# Patient Record
Sex: MALE | Race: WHITE | NOT HISPANIC OR LATINO | Employment: FULL TIME | URBAN - METROPOLITAN AREA
[De-identification: names, ages, dates, MRNs, and addresses within clinical notes are randomized per-mention and may not be internally consistent; named-entity substitution may affect disease eponyms.]

---

## 2017-10-07 ENCOUNTER — HOSPITAL ENCOUNTER (OUTPATIENT)
Dept: LAB | Facility: HOSPITAL | Age: 32
Discharge: HOME | End: 2017-10-07
Attending: FAMILY MEDICINE | Admitting: FAMILY MEDICINE

## 2017-10-07 LAB
ABS VARIANT LYMPHS: 0.64 10*3/UL
ALBUMIN SERPL-MCNC: 4.4 G/DL (ref 3.5–4.8)
ALBUMIN/GLOB SERPL: 1.5 {RATIO} (ref 1–1.7)
ALP SERPL-CCNC: 42 IU/L (ref 32–91)
ALT SERPL-CCNC: 121 IU/L (ref 17–63)
ANION GAP SERPL CALC-SCNC: 7.4 MMOL/L (ref 10–20)
AST SERPL-CCNC: 48 IU/L (ref 15–41)
BILIRUB SERPL-MCNC: 0.6 MG/DL (ref 0.3–1.2)
BUN SERPL-MCNC: 8 MG/DL (ref 8–20)
BUN/CREAT SERPL: 8 (ref 6.2–20.3)
CALCIUM SERPL-MCNC: 9.1 MG/DL (ref 8.9–10.3)
CHLORIDE SERPL-SCNC: 104 MMOL/L (ref 101–111)
CONV ABS BANDS: 0.1 10*3/UL
CONV CO2: 28 MMOL/L (ref 22–32)
CONV SMEAR REVIEW: (no result)
CONV TOTAL PROTEIN: 7.4 G/DL (ref 6.1–7.9)
CONV VARIANT LYMPHOCYTES RELATIVE PERCENT BY MANUAL COUNT: 9 % (ref 0–1)
CREAT UR-MCNC: 1 MG/DL (ref 0.7–1.2)
DIFFERENTIAL METHOD BLD: (no result)
EOSINOPHIL # BLD AUTO: 0.4 10*3/UL (ref 0–0.3)
EOSINOPHIL # BLD AUTO: 6 % (ref 0–3)
ERYTHROCYTE [DISTWIDTH] IN BLOOD BY AUTOMATED COUNT: 13.1 % (ref 11.5–14.5)
GLOBULIN UR ELPH-MCNC: 3 G/DL (ref 2.5–3.8)
GLUCOSE SERPL-MCNC: 98 MG/DL (ref 65–99)
HCT VFR BLD AUTO: 50.5 % (ref 40–54)
HGB BLD-MCNC: 17.7 G/DL (ref 14–18)
LYMPHOCYTES # BLD AUTO: 1.6 10*3/UL (ref 0.8–4.8)
LYMPHOCYTES NFR BLD AUTO: 23 % (ref 18–42)
MCH RBC QN AUTO: 30.8 PG (ref 26–32)
MCHC RBC AUTO-ENTMCNC: 35.2 G/DL (ref 32–36)
MCV RBC AUTO: 87.5 FL (ref 80–94)
MONOCYTES # BLD AUTO: 0.6 10*3/UL (ref 0.1–1.3)
MONOCYTES NFR BLD AUTO: 9 % (ref 2–11)
NEUTROPHILS # BLD AUTO: 3.8 10*3/UL (ref 2.3–8.6)
NEUTROPHILS NFR BLD AUTO: 52 % (ref 50–75)
NEUTS BAND NFR BLD MANUAL: 1 % (ref 0–5)
PLATELET # BLD AUTO: 375 10*3/UL (ref 150–450)
PMV BLD AUTO: 7.6 FL (ref 7.4–10.4)
POTASSIUM SERPL-SCNC: 4.4 MMOL/L (ref 3.6–5.1)
RBC # BLD AUTO: 5.77 10*6/UL (ref 4.6–6)
SODIUM SERPL-SCNC: 135 MMOL/L (ref 136–144)
WBC # BLD AUTO: 7.1 10*3/UL (ref 4.5–11.5)

## 2017-11-30 ENCOUNTER — HOSPITAL ENCOUNTER (OUTPATIENT)
Dept: OTHER | Facility: HOSPITAL | Age: 32
Setting detail: SPECIMEN
Discharge: HOME | End: 2017-11-30
Attending: NURSE PRACTITIONER | Admitting: NURSE PRACTITIONER

## 2017-11-30 LAB
ALBUMIN SERPL-MCNC: 4.3 G/DL (ref 3.5–4.8)
ALBUMIN/GLOB SERPL: 1.5 {RATIO} (ref 1–1.7)
ALP SERPL-CCNC: 39 IU/L (ref 32–91)
ALT SERPL-CCNC: 165 IU/L (ref 17–63)
ANION GAP SERPL CALC-SCNC: 11.5 MMOL/L (ref 10–20)
AST SERPL-CCNC: 85 IU/L (ref 15–41)
BILIRUB SERPL-MCNC: 0.7 MG/DL (ref 0.3–1.2)
BUN SERPL-MCNC: 19 MG/DL (ref 8–20)
BUN/CREAT SERPL: 19 (ref 6.2–20.3)
CALCIUM SERPL-MCNC: 9.3 MG/DL (ref 8.9–10.3)
CHLORIDE SERPL-SCNC: 102 MMOL/L (ref 101–111)
CHOLEST SERPL-MCNC: 239 MG/DL
CHOLEST/HDLC SERPL: 6.3 {RATIO}
CONV CO2: 27 MMOL/L (ref 22–32)
CONV LDL CHOLESTEROL DIRECT: 166 MG/DL (ref 0–100)
CONV TOTAL PROTEIN: 7.2 G/DL (ref 6.1–7.9)
CREAT UR-MCNC: 1 MG/DL (ref 0.7–1.2)
GLOBULIN UR ELPH-MCNC: 2.9 G/DL (ref 2.5–3.8)
GLUCOSE SERPL-MCNC: 73 MG/DL (ref 65–99)
HDLC SERPL-MCNC: 38 MG/DL
LDLC/HDLC SERPL: 4.4 {RATIO}
LIPID INTERPRETATION: ABNORMAL
POTASSIUM SERPL-SCNC: 4.5 MMOL/L (ref 3.6–5.1)
SODIUM SERPL-SCNC: 136 MMOL/L (ref 136–144)
TRIGL SERPL-MCNC: 209 MG/DL
VLDLC SERPL CALC-MCNC: 35 MG/DL

## 2017-12-28 ENCOUNTER — HOSPITAL ENCOUNTER (OUTPATIENT)
Dept: OTHER | Facility: HOSPITAL | Age: 32
Setting detail: SPECIMEN
Discharge: HOME | End: 2017-12-28
Attending: NURSE PRACTITIONER | Admitting: NURSE PRACTITIONER

## 2017-12-28 LAB
ALBUMIN SERPL-MCNC: 4.4 G/DL (ref 3.5–4.8)
ALBUMIN/GLOB SERPL: 1.8 {RATIO} (ref 1–1.7)
ALP SERPL-CCNC: 42 IU/L (ref 32–91)
ALT SERPL-CCNC: 66 IU/L (ref 17–63)
ANION GAP SERPL CALC-SCNC: 14.3 MMOL/L (ref 10–20)
AST SERPL-CCNC: 30 IU/L (ref 15–41)
BACTERIA SPEC AEROBE CULT: NORMAL
BASOPHILS # BLD AUTO: 0.1 10*3/UL (ref 0–0.2)
BASOPHILS NFR BLD AUTO: 1 % (ref 0–2)
BILIRUB SERPL-MCNC: 0.6 MG/DL (ref 0.3–1.2)
BUN SERPL-MCNC: 12 MG/DL (ref 8–20)
BUN/CREAT SERPL: 10 (ref 6.2–20.3)
CALCIUM SERPL-MCNC: 9.3 MG/DL (ref 8.9–10.3)
CHLORIDE SERPL-SCNC: 102 MMOL/L (ref 101–111)
CONV CO2: 24 MMOL/L (ref 22–32)
CONV TOTAL PROTEIN: 6.9 G/DL (ref 6.1–7.9)
CREAT UR-MCNC: 1.2 MG/DL (ref 0.7–1.2)
DIFFERENTIAL METHOD BLD: (no result)
EOSINOPHIL # BLD AUTO: 0 % (ref 0–3)
EOSINOPHIL # BLD AUTO: 0 10*3/UL (ref 0–0.3)
ERYTHROCYTE [DISTWIDTH] IN BLOOD BY AUTOMATED COUNT: 13.1 % (ref 11.5–14.5)
GLOBULIN UR ELPH-MCNC: 2.5 G/DL (ref 2.5–3.8)
GLUCOSE SERPL-MCNC: 86 MG/DL (ref 65–99)
HCT VFR BLD AUTO: 44.8 % (ref 40–54)
HGB BLD-MCNC: 15.6 G/DL (ref 14–18)
LYMPHOCYTES # BLD AUTO: 1 10*3/UL (ref 0.8–4.8)
LYMPHOCYTES NFR BLD AUTO: 15 % (ref 18–42)
Lab: NORMAL
MCH RBC QN AUTO: 30.5 PG (ref 26–32)
MCHC RBC AUTO-ENTMCNC: 34.8 G/DL (ref 32–36)
MCV RBC AUTO: 87.8 FL (ref 80–94)
MICRO REPORT STATUS: NORMAL
MONOCYTES # BLD AUTO: 0.9 10*3/UL (ref 0.1–1.3)
MONOCYTES NFR BLD AUTO: 12 % (ref 2–11)
NEUTROPHILS # BLD AUTO: 5.2 10*3/UL (ref 2.3–8.6)
NEUTROPHILS NFR BLD AUTO: 72 % (ref 50–75)
NRBC BLD AUTO-RTO: 0 /100{WBCS}
NRBC/RBC NFR BLD MANUAL: 0 10*3/UL
PLATELET # BLD AUTO: 317 10*3/UL (ref 150–450)
PMV BLD AUTO: 7.5 FL (ref 7.4–10.4)
POTASSIUM SERPL-SCNC: 4.3 MMOL/L (ref 3.6–5.1)
RBC # BLD AUTO: 5.1 10*6/UL (ref 4.6–6)
SODIUM SERPL-SCNC: 136 MMOL/L (ref 136–144)
SPECIMEN SOURCE: NORMAL
WBC # BLD AUTO: 7.1 10*3/UL (ref 4.5–11.5)

## 2018-08-30 ENCOUNTER — HOSPITAL ENCOUNTER (OUTPATIENT)
Dept: LAB | Facility: HOSPITAL | Age: 33
Discharge: HOME | End: 2018-08-30
Attending: PSYCHIATRY & NEUROLOGY | Admitting: PSYCHIATRY & NEUROLOGY

## 2018-08-30 LAB
ALBUMIN SERPL-MCNC: 4.6 G/DL (ref 3.5–4.8)
ALBUMIN/GLOB SERPL: 1.6 {RATIO} (ref 1–1.7)
ALP SERPL-CCNC: 47 IU/L (ref 32–91)
ALT SERPL-CCNC: 217 IU/L (ref 17–63)
ANION GAP SERPL CALC-SCNC: 13.5 MMOL/L (ref 10–20)
AST SERPL-CCNC: 94 IU/L (ref 15–41)
BASOPHILS # BLD AUTO: 0.1 10*3/UL (ref 0–0.2)
BASOPHILS NFR BLD AUTO: 1 % (ref 0–2)
BILIRUB SERPL-MCNC: 1 MG/DL (ref 0.3–1.2)
BUN SERPL-MCNC: 9 MG/DL (ref 8–20)
BUN/CREAT SERPL: 9 (ref 6.2–20.3)
CALCIUM SERPL-MCNC: 9.6 MG/DL (ref 8.9–10.3)
CHLORIDE SERPL-SCNC: 104 MMOL/L (ref 101–111)
CONV CO2: 25 MMOL/L (ref 22–32)
CONV TOTAL PROTEIN: 7.5 G/DL (ref 6.1–7.9)
CREAT UR-MCNC: 1 MG/DL (ref 0.7–1.2)
DIFFERENTIAL METHOD BLD: (no result)
EOSINOPHIL # BLD AUTO: 0.2 10*3/UL (ref 0–0.3)
EOSINOPHIL # BLD AUTO: 2 % (ref 0–3)
ERYTHROCYTE [DISTWIDTH] IN BLOOD BY AUTOMATED COUNT: 13.3 % (ref 11.5–14.5)
ERYTHROCYTE [SEDIMENTATION RATE] IN BLOOD BY WESTERGREN METHOD: 7 MM/HR (ref 0–15)
GLOBULIN UR ELPH-MCNC: 2.9 G/DL (ref 2.5–3.8)
GLUCOSE SERPL-MCNC: 94 MG/DL (ref 65–99)
HCT VFR BLD AUTO: 45.4 % (ref 40–54)
HGB BLD-MCNC: 16.1 G/DL (ref 14–18)
LYMPHOCYTES # BLD AUTO: 2.3 10*3/UL (ref 0.8–4.8)
LYMPHOCYTES NFR BLD AUTO: 31 % (ref 18–42)
MCH RBC QN AUTO: 31.2 PG (ref 26–32)
MCHC RBC AUTO-ENTMCNC: 35.6 G/DL (ref 32–36)
MCV RBC AUTO: 87.6 FL (ref 80–94)
MONOCYTES # BLD AUTO: 0.6 10*3/UL (ref 0.1–1.3)
MONOCYTES NFR BLD AUTO: 8 % (ref 2–11)
NEUTROPHILS # BLD AUTO: 4.5 10*3/UL (ref 2.3–8.6)
NEUTROPHILS NFR BLD AUTO: 58 % (ref 50–75)
NRBC BLD AUTO-RTO: 0 /100{WBCS}
NRBC/RBC NFR BLD MANUAL: 0 10*3/UL
PLATELET # BLD AUTO: 398 10*3/UL (ref 150–450)
PMV BLD AUTO: 7.9 FL (ref 7.4–10.4)
POTASSIUM SERPL-SCNC: 4.5 MMOL/L (ref 3.6–5.1)
RBC # BLD AUTO: 5.18 10*6/UL (ref 4.6–6)
SODIUM SERPL-SCNC: 138 MMOL/L (ref 136–144)
WBC # BLD AUTO: 7.7 10*3/UL (ref 4.5–11.5)

## 2018-08-31 LAB
ANA SER QL IA: ABNORMAL
CHROMATIN AB SERPL-ACNC: <20 [IU]/ML (ref 0–20)
HAV IGM SERPL QL IA: NONREACTIVE
HBV CORE IGM SERPL QL IA: NONREACTIVE
HBV SURFACE AG SERPL QL IA: NONREACTIVE
HCV AB SER DONR QL: NORMAL
HCV AB SER DONR QL: NORMAL

## 2019-05-24 ENCOUNTER — CONVERSION ENCOUNTER (OUTPATIENT)
Dept: OTHER | Facility: HOSPITAL | Age: 34
End: 2019-05-24

## 2019-05-30 ENCOUNTER — HOSPITAL ENCOUNTER (OUTPATIENT)
Dept: OTHER | Facility: HOSPITAL | Age: 34
Setting detail: SPECIMEN
Discharge: HOME | End: 2019-05-30
Attending: NURSE PRACTITIONER | Admitting: NURSE PRACTITIONER

## 2019-05-30 LAB
ALBUMIN SERPL-MCNC: 4.4 G/DL (ref 3.5–4.8)
ALBUMIN/GLOB SERPL: 1.5 {RATIO} (ref 1–1.7)
ALP SERPL-CCNC: 53 IU/L (ref 32–91)
ALT SERPL-CCNC: 76 IU/L (ref 17–63)
ANION GAP SERPL CALC-SCNC: 18.2 MMOL/L (ref 10–20)
AST SERPL-CCNC: 36 IU/L (ref 15–41)
BILIRUB SERPL-MCNC: 0.7 MG/DL (ref 0.3–1.2)
BUN SERPL-MCNC: 12 MG/DL (ref 8–20)
BUN/CREAT SERPL: 10.9 (ref 6.2–20.3)
CALCIUM SERPL-MCNC: 9.3 MG/DL (ref 8.9–10.3)
CHLORIDE SERPL-SCNC: 103 MMOL/L (ref 101–111)
CONV CO2: 20 MMOL/L (ref 22–32)
CONV TOTAL PROTEIN: 7.3 G/DL (ref 6.1–7.9)
CREAT UR-MCNC: 1.1 MG/DL (ref 0.7–1.2)
GLOBULIN UR ELPH-MCNC: 2.9 G/DL (ref 2.5–3.8)
GLUCOSE SERPL-MCNC: 116 MG/DL (ref 65–99)
POTASSIUM SERPL-SCNC: 4.2 MMOL/L (ref 3.6–5.1)
SODIUM SERPL-SCNC: 137 MMOL/L (ref 136–144)

## 2019-06-04 VITALS
DIASTOLIC BLOOD PRESSURE: 86 MMHG | SYSTOLIC BLOOD PRESSURE: 141 MMHG | OXYGEN SATURATION: 97 % | WEIGHT: 237 LBS | HEIGHT: 72 IN | HEART RATE: 90 BPM | BODY MASS INDEX: 32.1 KG/M2

## 2019-06-06 ENCOUNTER — HOSPITAL ENCOUNTER (OUTPATIENT)
Dept: ULTRASOUND IMAGING | Facility: HOSPITAL | Age: 34
Discharge: HOME | End: 2019-06-06
Attending: NURSE PRACTITIONER | Admitting: NURSE PRACTITIONER

## 2019-06-06 NOTE — PROGRESS NOTES
Visit Type:  Annual Physical  Referring Provider:  Camille Odonnell NP  Primary Provider:  Blas SETHI NP      History of Present Illness:  This is a 33 year old male who presents for an annual exam. Patient had recently had a back injury and has been doing well since. He had labs completed in the fall.           Risk Factors:     Smoked Tobacco Use:  Never smoker  Smokeless Tobacco Use:  Never  Passive smoke exposure:  no  Drug use:  yes     Substance:  marijuana  HIV high-risk behavior:  no  Caffeine use:  0 drinks per day  Alcohol use:  yes     Type:  occasional     Drinks per day:  social     Has patient --        Felt need to cut down:  no        Been annoyed by complaints:  no        Felt guilty about drinking:  no        Needed eye opener in the morning:  no     Counseled to quit/cut down alcohol use:  no  Exercise:  yes  Seatbelt use:  100 %  Sun Exposure:  occasionally    Family History Risk Factors:     Family History of MI in females < 65 years old:  no     Family History of MI in males < 55 years old:  no    Previous Tobacco Use: Signed On - 02/12/2019  Smoked Tobacco Use:  Never smoker  Smokeless Tobacco Use:  Never  Passive smoke exposure:  no  Drug use:  yes     Substance:  marijuana  HIV high-risk behavior:  no  Caffeine use:  0 drinks per day    Previous Alcohol Use: Signed On - 02/12/2019  Alcohol use:  yes     Type:  occasional     Drinks per day:  social     Has patient --        Felt need to cut down:  no        Been annoyed by complaints:  no        Felt guilty about drinking:  no        Needed eye opener in the morning:  no     Counseled to quit/cut down alcohol use:  no  Exercise:  yes  Seatbelt use:  100 %  Sun Exposure:  occasionally    Family History Risk Factors:     Family History of MI in females < 65 years old:  no     Family History of MI in males < 55 years old:  no      Review of Systems     General       Complains of weight gain.       Denies fever, chills, sweats, anorexia,  fatigue, weakness, malaise, weight loss and sleep disorder.    Eyes       Complains of eye irritation.       Denies vision loss - both eyes, blurring, eye pain, halos, discharge, light sensitivity, Color Blindness, Decreased night vision, excessive tearing, eye redness and periorbital puffiness.    ENT       Denies ringing in the ears, ear discharge, earache, decreased hearing, nasal congestion, nosebleeds, difficulty swallowing, hoarseness, sore throat, Post-nasal drainage, sneezing, bleeding gums, oral ulcers, decreased sense of smell, dental issues,   runny nose, sinus pain and decreased sense of taste.    CV       Denies difficulty breathing at night, near fainting, chest pain or discomfort, racing/skipping heart beats, fatigue, lightheadedness, shortness of breath with exertion, palpitations, swelling of hands or feet, difficulty breathing while lying down,   fainting, leg cramps with exertion, bluish discoloration of lips or nails, weight gain, leg cramps, leg pain, varicose veins, paroxysmal nocturnal dyspnea and bluish or purplish discoloration of hands/feet.    Resp       Denies sleep disturbances due to breathing, cough, shortness of breath, coughing up blood, chest discomfort, wheezing, excessive sputum, excessive snoring and TB exposure risk.    GI       Complains of excessive appetite.       Denies loss of appetite, indigestion, vomiting blood, nausea, vomiting, yellowish skin color, gas, abdominal pain, abdominal bloating, hemorrhoids, diarrhea, change in bowel habits, constipation, dark tarry stools, bloody stools, abdominal mass,   abdominal swelling, food intolerance, early satiety, stool incontinence, laxative use, odynophagia, painful defecation, need for antacids and blood after wiping.           Denies dysuria, hematuria, discharge, urinary frequency, urinary hesitancy, nocturia, incontinence, genital sores, decreased libido, erectile dysfunction, Change in bladder habits, Change in urinary  stream, Erection at night, Flank Pain, Testicular   Mass, Testicular Pain, Urgency, Retention, Kidney Stones, Difficulty Stopping Stream, Hx of STD and Recurrent UTI's.    MS       Complains of back pain and stiffness.       Denies muscle cramps, joint pain, joint swelling, presence of joint fluid, muscle weakness, arthritis, gout, loss of strength, muscle aches, neck pain, decreased ROM and joint redness.    Derm       Denies excessive perspiration, night sweats, suspicious lesions, changes in nail beds, dryness, poor wound healing, unusual hair distribution, skin cancer, itching, changes in color of skin, flushing, rash, change in lesion, hair loss, change in hair   texture, lesion with inflammation, lesion with increase in size and lesion with change in color.        eczematous rash on bilateral posterior upper arms    Neuro       Denies difficulty with concentration, poor balance, headaches, disturbances in coordination, numbness, inability to speak, falling down, tingling, brief paralysis, visual disturbances, seizures, weakness, sensation of room spinning, tremors, fainting,   excessive daytime sleeping, memory loss, dizziness, attention deficit, hyperactivity, unusual sensation and restless legs.    Psych       Complains of anxiety and depression.       Denies sense of great danger, thoughts of suicide, mental problems, thoughts of violence, frightening visions or sounds, change in sleep pattern, delusions, disorientation, easily irritated, feels safe at home, frequent crying, hallucinations,   hypersomnia, impaired cognitive function, inability to concentrate, mood changes, nervousness and personality changes.    Endo       Complains of excessive hunger and weight change.       Denies cold intolerance, heat intolerance, excessive urination, excessive  thirst, appetite changes, excessive sweating, hair changes, hot flashes, libido change, change in body hair and Hypoglycemic episodes.    Heme       Denies  enlarged lymph nodes, bleeding, skin discoloration, abnormal bruising, fevers and nose bleeds.    Allergy       Complains of seasonal allergies.       Denies persistent infections, hives or rash and HIV exposure.    Breast       Denies Mass, Pain, Swelling, Nipple discharge, Nipple pain, Skin changes and Change in size.      Vital Signs:    Patient Profile:    33 Years Old Male  Height:     72 inches  Weight:     237 pounds  BMI:        32.14     O2 Sat:     97 %  Pulse rate: 90 / minute  BP Sittin / 86  (left arm)    Cuff size:  large    Allergies: Allergies were reviewed with the patient during this visit.  No Known Allergy.        Vitals Entered By: [employer] (May 24, 2019 1:09 PM)      Vital Signs:    Patient Profile:    33 Years Old Male  Height:     72 inches  Weight:     237 pounds  (Measured Weight:   lbs.  oz.)  BMI:        32.14  Pulse rate: 90 / minute  O2 Sat:     97 %    BP sittin / 86  (left arm)  Cuff size:  large   Vitals Entered By: [facility] (May 24, 2019 1:09 PM)    Allergies:   No Known Allergies  Allergies were reviewed with the patient during this visit.      Physical Exam    General:      well developed, well nourished, in no acute distress.    Head:      normocephalic and atraumatic.    Eyes:      PERRL/EOM intact, conjunctiva and sclera clear with out nystagmus.    Ears:      TM's intact and clear with normal canals with grossly normal hearing.    Nose:      no deformity, discharge, inflammation, or lesions.    Mouth:      no deformity or lesions with good dentition.    Lungs:      clear bilaterally to auscultation.    Heart:      non-displaced PMI, chest non-tender; regular rate and rhythm, S1, S2 without murmurs, rubs, or gallops  Abdomen:       normal bowel sounds; no hepatosplenomegaly no ventral,umbilical hernias or masses noted.    Msk:      no deformity or scoliosis noted of thoracic or lumbar spine.    Pulses:      pulses normal in all 4 extremities.     Extremities:      no clubbing, cyanosis, edema, or deformity noted with normal full range of motion of joints of all four extremities   Neurologic:      no focal deficits, cranial nerves II-XII grossly intact with normal sensation, reflexes  Skin:      intact without lesions or rashes.  eczematous rash on bilateral posterior upper arms.    Cervical Nodes:      no significant adenopathy.    Psych:      alert and cooperative; normal mood and affect; normal attention span and concentration.      Diabetes Management Exam:      Foot Exam (with socks and/or shoes not present):        Pulses:           pulses normal in all 4 extremities.        Blood Pressure:  Today's BP: 141/86 mm Hg    Labwork:   Most Recent Lab Results:   LDL: 166 mg/dL 11/30/2017          Impression & Recommendations:    Problem # 1:  Preventive health care, adult (ICD-V70.0) (DKZ95-A44.00)  Will get labs today   Orders:  Est. Well Exam 18-39 yrs. (60234)      Problem # 2:  Depression (ICD-311) (TCF59-K14.9)  Stable.    Problem # 3:  Back pain (ICD-724.5) (JBT40-R46.9)  Assessment: Improved  Baclofen PRN    Problem # 4:  Migraine headaches (ICD-346.90) (VEF08-Q96.909)  Assessment: Improved  Stable    Problem # 5:  Elevated liver enzymes (ICD-790.6) (KGN42-I79.8)  Repeat CMP. Negative hepatitis panel.  RUQ US.  Refer to GI.   Orders:  [facility] COMPREHENSIVE METABOLIC PANEL (CMP) (MPC)  US RIGHT UPPER QUANDRANT (Gallbladder) (CPT-80011)      Problem # 6:  Eczema, atopic dermatitis (ICD-692.9) (DPI86-L15.9)  Eucrisa sample given to try.   His updated medication list for this problem includes:     Claritin 10 Mg Oral Capsule (Loratadine) ..... Take one by mouth daily        Patient Instructions:  1)  Follow up in 6 months.                      Medication Administration    Orders Added:  1)  Gastro Consult [GIOV]  2)  [facility] COMPREHENSIVE METABOLIC PANEL (CMP) [MPC]  3)  US RIGHT UPPER QUANDRANT (Gallbladder) [CPT-33035]  4)  Est. Well Exam 18-39 yrs.  [62138]        Electronically signed by Camille Odonnell on 05/27/2019 at 3:15 PM  ________________________________________________________________________       Disclaimer: Converted Note message may not contain all data elements that existed in the legacy source system. Please see [employer] for the original note details.

## 2019-06-07 ENCOUNTER — OFFICE (OUTPATIENT)
Dept: URBAN - METROPOLITAN AREA CLINIC 64 | Facility: CLINIC | Age: 34
End: 2019-06-07

## 2019-06-07 VITALS
HEIGHT: 74 IN | DIASTOLIC BLOOD PRESSURE: 93 MMHG | HEART RATE: 57 BPM | WEIGHT: 238 LBS | SYSTOLIC BLOOD PRESSURE: 132 MMHG

## 2019-06-07 DIAGNOSIS — K76.0 FATTY (CHANGE OF) LIVER, NOT ELSEWHERE CLASSIFIED: ICD-10-CM

## 2019-06-07 DIAGNOSIS — R74.0 NONSPECIFIC ELEVATION OF LEVELS OF TRANSAMINASE AND LACTIC A: ICD-10-CM

## 2019-06-07 DIAGNOSIS — R93.3 ABNORMAL FINDINGS ON DIAGNOSTIC IMAGING OF OTHER PARTS OF DI: ICD-10-CM

## 2019-06-07 PROCEDURE — 99202 OFFICE O/P NEW SF 15 MIN: CPT | Performed by: NURSE PRACTITIONER

## 2019-06-11 ENCOUNTER — HOSPITAL ENCOUNTER (OUTPATIENT)
Dept: MRI IMAGING | Facility: HOSPITAL | Age: 34
Discharge: HOME | End: 2019-06-11
Attending: NURSE PRACTITIONER | Admitting: NURSE PRACTITIONER

## 2019-06-27 ENCOUNTER — OFFICE (OUTPATIENT)
Dept: URBAN - METROPOLITAN AREA CLINIC 64 | Facility: CLINIC | Age: 34
End: 2019-06-27

## 2019-06-27 VITALS
HEART RATE: 63 BPM | SYSTOLIC BLOOD PRESSURE: 124 MMHG | HEIGHT: 74 IN | WEIGHT: 238 LBS | DIASTOLIC BLOOD PRESSURE: 79 MMHG

## 2019-06-27 DIAGNOSIS — R74.0 NONSPECIFIC ELEVATION OF LEVELS OF TRANSAMINASE AND LACTIC A: ICD-10-CM

## 2019-06-27 DIAGNOSIS — K76.0 FATTY (CHANGE OF) LIVER, NOT ELSEWHERE CLASSIFIED: ICD-10-CM

## 2019-06-27 DIAGNOSIS — K21.9 GASTRO-ESOPHAGEAL REFLUX DISEASE WITHOUT ESOPHAGITIS: ICD-10-CM

## 2019-06-27 DIAGNOSIS — E78.1 PURE HYPERGLYCERIDEMIA: ICD-10-CM

## 2019-06-27 DIAGNOSIS — E78.00 PURE HYPERCHOLESTEROLEMIA, UNSPECIFIED: ICD-10-CM

## 2019-06-27 PROCEDURE — 99214 OFFICE O/P EST MOD 30 MIN: CPT | Performed by: NURSE PRACTITIONER

## 2019-06-27 RX ORDER — RANITIDINE 150 MG/1
150 TABLET ORAL
Qty: 90 | Refills: 3 | Status: COMPLETED
Start: 2019-06-27 | End: 2021-10-29

## 2019-06-27 RX ORDER — OMEGA-3 FATTY ACIDS CAP 1000 MG 1000 MG
1 CAP ORAL
Qty: 90 | Refills: 3 | Status: COMPLETED
Start: 2019-06-27 | End: 2021-10-29

## 2019-06-27 RX ORDER — FENOFIBRATE 145 MG/1
145 TABLET ORAL
Qty: 90 | Refills: 3 | Status: COMPLETED
Start: 2019-06-27 | End: 2021-10-29

## 2021-04-30 ENCOUNTER — HOSPITAL ENCOUNTER (EMERGENCY)
Facility: HOSPITAL | Age: 36
Discharge: ANOTHER HOSPITAL | End: 2021-05-01
Admitting: EMERGENCY MEDICINE

## 2021-04-30 DIAGNOSIS — R45.851 SUICIDAL IDEATION: Primary | ICD-10-CM

## 2021-04-30 PROCEDURE — 99284 EMERGENCY DEPT VISIT MOD MDM: CPT

## 2021-05-01 VITALS
RESPIRATION RATE: 15 BRPM | SYSTOLIC BLOOD PRESSURE: 132 MMHG | HEIGHT: 74 IN | TEMPERATURE: 97.5 F | DIASTOLIC BLOOD PRESSURE: 82 MMHG | WEIGHT: 229.28 LBS | HEART RATE: 62 BPM | OXYGEN SATURATION: 100 % | BODY MASS INDEX: 29.43 KG/M2

## 2021-05-01 NOTE — ED PROVIDER NOTES
Subjective   35-year-old  male presents to the emergency room with complaint of suicidal ideation with a plan of using a razor blade to end his life.  Patient denies any alcohol or recreational drug abuse.  Patient states that he has a long history of psychiatric problems and admission to the hospital when he was a child.  Patient states he hasn't taken any medications in years for his psychiatry issues.  Patient denies any recent triggers to cause the suicidal feelings.  Onset: Unknown  Location: None  Duration: Unknown   character: Worsening  Aggravating/Alleviating Factors: Unknown/none  Radiation: None  Severity: Severe            Review of Systems   Psychiatric/Behavioral: Positive for suicidal ideas.   All other systems reviewed and are negative.      No past medical history on file.    No Known Allergies    No past surgical history on file.    No family history on file.    Social History     Socioeconomic History   • Marital status: Single     Spouse name: Not on file   • Number of children: Not on file   • Years of education: Not on file   • Highest education level: Not on file           Objective   Physical Exam  Constitutional:       General: He is not in acute distress.     Appearance: He is not ill-appearing, toxic-appearing or diaphoretic.   HENT:      Head: Normocephalic and atraumatic.      Mouth/Throat:      Mouth: Mucous membranes are moist.      Pharynx: Oropharynx is clear.   Eyes:      Extraocular Movements: Extraocular movements intact.      Conjunctiva/sclera: Conjunctivae normal.      Pupils: Pupils are equal, round, and reactive to light.   Musculoskeletal:         General: Normal range of motion.   Skin:     General: Skin is warm and dry.      Capillary Refill: Capillary refill takes less than 2 seconds.   Neurological:      General: No focal deficit present.      Mental Status: He is alert and oriented to person, place, and time.   Psychiatric:         Attention and Perception:  Attention and perception normal. He does not perceive auditory or visual hallucinations.         Mood and Affect: Affect is blunt and angry.         Speech: Speech normal.         Behavior: Behavior is agitated and withdrawn. Behavior is cooperative.         Thought Content: Thought content includes suicidal ideation. Thought content includes suicidal plan.         Procedures           ED Course      Physical exam performed.  Interview performed by [facility]. Margaret with [facility] reports and confirms patient is suicidal and major depression which would require admission.  [facility] is full and can not accept at this time.  Referral to [facility].  [facility] accepts  Per Dr Venegas for immediate transfer and acceptance of care.  EMTALA                                     MDM    Final diagnoses:   Suicidal ideation       ED Disposition  ED Disposition     ED Disposition Condition Comment    Transfer to Another Facility   To [facility] accepting MD.          No follow-up provider specified.       Medication List      No changes were made to your prescriptions during this visit.          Martha Clay, APRN  05/01/21 0203

## 2021-05-01 NOTE — ED NOTES
Pt is sitting in bedside chair doing evaluation with [facility] via. Ipad     Natividad Link LPN  04/30/21 3575

## 2021-05-01 NOTE — ED NOTES
"Pt states that he has intent to harm himself, he has a plan . He states that he has attempted suicide in the past, his whole life. When doing my assessment pt answers a majority of my questions with \"I don't know, can you just get someone to talk to\"     Natividad Link, LPN  04/30/21 2137       Natividad Link, LPN  04/30/21 2149    "

## 2021-08-10 ENCOUNTER — APPOINTMENT (OUTPATIENT)
Dept: GENERAL RADIOLOGY | Facility: HOSPITAL | Age: 36
End: 2021-08-10

## 2021-08-10 ENCOUNTER — HOSPITAL ENCOUNTER (EMERGENCY)
Facility: HOSPITAL | Age: 36
Discharge: HOME | End: 2021-08-11
Admitting: EMERGENCY MEDICINE

## 2021-08-10 DIAGNOSIS — M79.675 TOE PAIN, LEFT: Primary | ICD-10-CM
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
## 2021-08-10 DIAGNOSIS — S93.105A DISLOCATED TOE, LEFT, INITIAL ENCOUNTER: ICD-10-CM

## 2021-08-10 PROCEDURE — 99283 EMERGENCY DEPT VISIT LOW MDM: CPT

## 2021-08-10 PROCEDURE — 63710000001 ONDANSETRON ODT 4 MG TABLET DISPERSIBLE: Performed by: NURSE PRACTITIONER

## 2021-08-10 PROCEDURE — 96372 THER/PROPH/DIAG INJ SC/IM: CPT

## 2021-08-10 PROCEDURE — 25010000002 MORPHINE PER 10 MG: Performed by: NURSE PRACTITIONER

## 2021-08-10 PROCEDURE — 73630 X-RAY EXAM OF FOOT: CPT

## 2021-08-10 RX ORDER — MORPHINE SULFATE 4 MG/ML
4 INJECTION, SOLUTION INTRAMUSCULAR; INTRAVENOUS ONCE
Status: COMPLETED | OUTPATIENT
Start: 2021-08-10 | End: 2021-08-10

## 2021-08-10 RX ORDER — ONDANSETRON 4 MG/1
4 TABLET, ORALLY DISINTEGRATING ORAL ONCE
Status: COMPLETED | OUTPATIENT
Start: 2021-08-10 | End: 2021-08-10

## 2021-08-10 RX ADMIN — MORPHINE SULFATE 4 MG: 4 INJECTION INTRAVENOUS at 23:16

## 2021-08-10 RX ADMIN — ONDANSETRON 4 MG: 4 TABLET, ORALLY DISINTEGRATING ORAL at 23:16

## 2021-08-11 VITALS
SYSTOLIC BLOOD PRESSURE: 120 MMHG | BODY MASS INDEX: 32.08 KG/M2 | HEIGHT: 74 IN | WEIGHT: 250 LBS | TEMPERATURE: 98.4 F | HEART RATE: 82 BPM | DIASTOLIC BLOOD PRESSURE: 79 MMHG | RESPIRATION RATE: 18 BRPM | OXYGEN SATURATION: 98 %

## 2021-08-11 RX ORDER — HYDROCODONE BITARTRATE AND ACETAMINOPHEN 7.5; 325 MG/1; MG/1
1 TABLET ORAL ONCE
Status: COMPLETED | OUTPATIENT
Start: 2021-08-11 | End: 2021-08-11

## 2021-08-11 RX ADMIN — HYDROCODONE BITARTRATE AND ACETAMINOPHEN 1 TABLET: 7.5; 325 TABLET ORAL at 00:44

## 2021-08-11 NOTE — DISCHARGE INSTRUCTIONS
Keep toes holly taped. Tylenol or ibuprofen for pain. Use postop shoe and crutches for ambulation.  Call Dr. Mathis office tomorrow for follow-up.  May use

## 2021-08-11 NOTE — ED PROVIDER NOTES
Subjective   History: Patient is a 36-year-old male complains of left toe pain to the fourth and fifth digits just prior to arrival.  Rep[place] the skate [place] and slipped off the pedal falling onto the ground.  States he immediately could not walk due to pain.  Denies numbness tingling      Onset: 30 minutes  Location: Fourth and fifth digits left foot  Duration: Constant  Character: Throb  Aggravating/Alleviating factors: Any pressure makes pain worse  Radiation nonradiating  Severity: Moderate            Review of Systems   Constitutional: Negative for chills, fatigue and fever.   HENT: Negative for congestion, sore throat, tinnitus and trouble swallowing.    Eyes: Negative for photophobia, discharge and visual disturbance.   Respiratory: Negative for cough and shortness of breath.    Cardiovascular: Negative for chest pain.   Gastrointestinal: Negative for abdominal pain, diarrhea, nausea and vomiting.   Genitourinary: Negative for dysuria, frequency and urgency.   Musculoskeletal: Positive for arthralgias. Negative for back pain and myalgias.   Skin: Negative for rash.   Neurological: Negative for dizziness and headaches.   Psychiatric/Behavioral: Negative for confusion.       No past medical history on file.    No Known Allergies    No past surgical history on file.    No family history on file.    Social History     Socioeconomic History   • Marital status: Single     Spouse name: Not on file   • Number of children: Not on file   • Years of education: Not on file   • Highest education level: Not on file           Objective   Physical Exam  Vitals reviewed.   Constitutional:       General: He is not in acute distress.     Appearance: He is normal weight. He is not toxic-appearing.   HENT:      Head: Normocephalic.   Cardiovascular:      Rate and Rhythm: Normal rate.   Pulmonary:      Effort: Pulmonary effort is normal.   Musculoskeletal:         General: Tenderness and deformity present. Normal range of  "motion.      Comments: Tenderness over fourth and fifth MTP joints no erythema edema   Skin:     General: Skin is warm and dry.   Neurological:      Mental Status: He is alert and oriented to person, place, and time.   Psychiatric:         Mood and Affect: Mood normal.         Behavior: Behavior normal.         Thought Content: Thought content normal.         Judgment: Judgment normal.         Procedures           ED Course    /81 (BP Location: Left arm, Patient Position: Sitting)   Pulse 88   Temp 98.5 °F (36.9 °C) (Oral)   Resp 16   Ht 188 cm (74\")   Wt 113 kg (250 lb)   SpO2 95%   BMI 32.10 kg/m²   Labs Reviewed - No data to display  Medications   HYDROcodone-acetaminophen (NORCO) 7.5-325 MG per tablet 1 tablet (has no administration in time range)   Morphine sulfate (PF) injection 4 mg (4 mg Subcutaneous Given 8/10/21 2316)   ondansetron ODT (ZOFRAN-ODT) disintegrating tablet 4 mg (4 mg Oral Given 8/10/21 2316)     XR Foot 3+ View Left    Result Date: 8/10/2021  Dislocated fourth and fifth MTP joints with soft tissue swelling.  Electronically Signed By-Triston Llanes MD On:8/10/2021 10:42 PM This report was finalized on [id number] by  Triston Llanes MD.      ED Course as of Aug 11 0030   Tue Aug 10, 2021   2310 Attempted reduction of fourth and fifth digit to the left foot with likely good reduction of fourth digit.  Unsure of reduction of fifth digit to left foot.  Will do post x-ray left foot and reassess    [KJ]      ED Course User Index  [KJ] Kinjal Barkley, APRN                                           MDM     I examined the patient using the appropriate personal protective equipment.      DISPOSITION:   Chart Review:  Comorbidity:  has no past medical history on file.  Differentials:this list is not all inclusive and does not constitute the entirety of considered causes --> fracture dislocation  ECG: interpreted by ER physician and reviewed by myself: Applicable  Labs: Not " applicable    Imaging: Was interpreted by physician and reviewed by myself:  XR Foot 3+ View Left    Result Date: 8/10/2021  Dislocated fourth and fifth MTP joints with soft tissue swelling.  Electronically Signed By-Triston Llanes MD On:8/10/2021 10:42 PM This report was finalized on [id number] by  Triston Llanes MD.      Disposition/Treatment:    X-ray left foot showed dislocation of fourth and fifth MTP without fracture.  Successful reduction of fourth MTP with postreduction x-ray showing fifth MTP continued to be dislocated.  Unable to reduce fifth MTP after 2 attempts.  Patient was given morphine subcu Zofran ODT after initial attempt.  After second attempt given Norco.  Discussed with my attending, Dr. Garibay, discussed possibility of chronic dislocation however patient does not believe that is the case.  Will be placed in postop shoe given crutches and follow-up with podiatry.  Discussed results with patient verbalized understanding agreeable plan of care.    Prescription: None    Final diagnoses:   Toe pain, left   Fall, initial encounter   Dislocated toe, left, initial encounter       ED Disposition  ED Disposition     ED Disposition Condition Comment    Discharge Stable           BLAYNE Mathis DPM  [address] [address]  [phone]    Schedule an appointment as soon as possible for a visit            Medication List      No changes were made to your prescriptions during this visit.          Kinjal Barkley, APRN  08/11/21 0030

## 2021-10-20 ENCOUNTER — HOSPITAL ENCOUNTER (EMERGENCY)
Facility: HOSPITAL | Age: 36
Discharge: HOME | End: 2021-10-20
Admitting: EMERGENCY MEDICINE

## 2021-10-20 ENCOUNTER — APPOINTMENT (OUTPATIENT)
Dept: CT IMAGING | Facility: HOSPITAL | Age: 36
End: 2021-10-20

## 2021-10-20 VITALS
HEART RATE: 81 BPM | OXYGEN SATURATION: 100 % | SYSTOLIC BLOOD PRESSURE: 127 MMHG | DIASTOLIC BLOOD PRESSURE: 88 MMHG | TEMPERATURE: 97.9 F | WEIGHT: 266.2 LBS | HEIGHT: 74 IN | RESPIRATION RATE: 16 BRPM | BODY MASS INDEX: 34.16 KG/M2

## 2021-10-20 DIAGNOSIS — R31.9 HEMATURIA, UNSPECIFIED TYPE: ICD-10-CM

## 2021-10-20 DIAGNOSIS — R10.9 FLANK PAIN: ICD-10-CM

## 2021-10-20 DIAGNOSIS — N20.1 URETEROLITHIASIS: Primary | ICD-10-CM

## 2021-10-20 DIAGNOSIS — R11.2 NAUSEA AND VOMITING, INTRACTABILITY OF VOMITING NOT SPECIFIED, UNSPECIFIED VOMITING TYPE: ICD-10-CM

## 2021-10-20 LAB
ALBUMIN SERPL-MCNC: 4.3 G/DL (ref 3.5–5.2)
ALBUMIN/GLOB SERPL: 1.7 G/DL
ALP SERPL-CCNC: 57 U/L (ref 39–117)
ALT SERPL W P-5'-P-CCNC: 205 U/L (ref 1–41)
ANION GAP SERPL CALCULATED.3IONS-SCNC: 13 MMOL/L (ref 5–15)
AST SERPL-CCNC: 69 U/L (ref 1–40)
BACTERIA UR QL AUTO: ABNORMAL /HPF
BASOPHILS # BLD AUTO: 0.1 10*3/MM3 (ref 0–0.2)
BASOPHILS NFR BLD AUTO: 0.8 % (ref 0–1.5)
BILIRUB SERPL-MCNC: 0.6 MG/DL (ref 0–1.2)
BILIRUB UR QL STRIP: NEGATIVE
BUN SERPL-MCNC: 8 MG/DL (ref 6–20)
BUN/CREAT SERPL: 9.9 (ref 7–25)
CALCIUM SPEC-SCNC: 8.3 MG/DL (ref 8.6–10.5)
CHLORIDE SERPL-SCNC: 104 MMOL/L (ref 98–107)
CLARITY UR: CLEAR
CO2 SERPL-SCNC: 21 MMOL/L (ref 22–29)
COLOR UR: YELLOW
CREAT SERPL-MCNC: 0.81 MG/DL (ref 0.76–1.27)
DEPRECATED RDW RBC AUTO: 42.9 FL (ref 37–54)
EOSINOPHIL # BLD AUTO: 0.2 10*3/MM3 (ref 0–0.4)
EOSINOPHIL NFR BLD AUTO: 2.1 % (ref 0.3–6.2)
ERYTHROCYTE [DISTWIDTH] IN BLOOD BY AUTOMATED COUNT: 13.7 % (ref 12.3–15.4)
GFR SERPL CREATININE-BSD FRML MDRD: 107 ML/MIN/1.73
GLOBULIN UR ELPH-MCNC: 2.6 GM/DL
GLUCOSE SERPL-MCNC: 107 MG/DL (ref 65–99)
GLUCOSE UR STRIP-MCNC: NEGATIVE MG/DL
HCT VFR BLD AUTO: 43.3 % (ref 37.5–51)
HGB BLD-MCNC: 15.5 G/DL (ref 13–17.7)
HGB UR QL STRIP.AUTO: ABNORMAL
HYALINE CASTS UR QL AUTO: ABNORMAL /LPF
KETONES UR QL STRIP: NEGATIVE
LEUKOCYTE ESTERASE UR QL STRIP.AUTO: NEGATIVE
LIPASE SERPL-CCNC: 37 U/L (ref 13–60)
LYMPHOCYTES # BLD AUTO: 1.9 10*3/MM3 (ref 0.7–3.1)
LYMPHOCYTES NFR BLD AUTO: 26.1 % (ref 19.6–45.3)
MCH RBC QN AUTO: 32.2 PG (ref 26.6–33)
MCHC RBC AUTO-ENTMCNC: 35.8 G/DL (ref 31.5–35.7)
MCV RBC AUTO: 90 FL (ref 79–97)
MONOCYTES # BLD AUTO: 0.6 10*3/MM3 (ref 0.1–0.9)
MONOCYTES NFR BLD AUTO: 8.6 % (ref 5–12)
NEUTROPHILS NFR BLD AUTO: 4.7 10*3/MM3 (ref 1.7–7)
NEUTROPHILS NFR BLD AUTO: 62.4 % (ref 42.7–76)
NITRITE UR QL STRIP: NEGATIVE
NRBC BLD AUTO-RTO: 0.1 /100 WBC (ref 0–0.2)
PH UR STRIP.AUTO: 7.5 [PH] (ref 5–8)
PLATELET # BLD AUTO: 312 10*3/MM3 (ref 140–450)
PMV BLD AUTO: 7.4 FL (ref 6–12)
POTASSIUM SERPL-SCNC: 4.1 MMOL/L (ref 3.5–5.2)
PROT SERPL-MCNC: 6.9 G/DL (ref 6–8.5)
PROT UR QL STRIP: NEGATIVE
RBC # BLD AUTO: 4.81 10*6/MM3 (ref 4.14–5.8)
RBC # UR: ABNORMAL /HPF
REF LAB TEST METHOD: ABNORMAL
SODIUM SERPL-SCNC: 138 MMOL/L (ref 136–145)
SP GR UR STRIP: 1.02 (ref 1–1.03)
SQUAMOUS #/AREA URNS HPF: ABNORMAL /HPF
UROBILINOGEN UR QL STRIP: ABNORMAL
WBC # BLD AUTO: 7.5 10*3/MM3 (ref 3.4–10.8)
WBC UR QL AUTO: ABNORMAL /HPF

## 2021-10-20 PROCEDURE — 25010000002 KETOROLAC TROMETHAMINE PER 15 MG: Performed by: PHYSICIAN ASSISTANT

## 2021-10-20 PROCEDURE — 81001 URINALYSIS AUTO W/SCOPE: CPT | Performed by: PHYSICIAN ASSISTANT

## 2021-10-20 PROCEDURE — 99283 EMERGENCY DEPT VISIT LOW MDM: CPT

## 2021-10-20 PROCEDURE — 83690 ASSAY OF LIPASE: CPT | Performed by: PHYSICIAN ASSISTANT

## 2021-10-20 PROCEDURE — 25010000002 ONDANSETRON PER 1 MG: Performed by: PHYSICIAN ASSISTANT

## 2021-10-20 PROCEDURE — 96374 THER/PROPH/DIAG INJ IV PUSH: CPT

## 2021-10-20 PROCEDURE — 85025 COMPLETE CBC W/AUTO DIFF WBC: CPT | Performed by: PHYSICIAN ASSISTANT

## 2021-10-20 PROCEDURE — 74176 CT ABD & PELVIS W/O CONTRAST: CPT

## 2021-10-20 PROCEDURE — 80053 COMPREHEN METABOLIC PANEL: CPT | Performed by: PHYSICIAN ASSISTANT

## 2021-10-20 PROCEDURE — 96375 TX/PRO/DX INJ NEW DRUG ADDON: CPT

## 2021-10-20 RX ORDER — TAMSULOSIN HYDROCHLORIDE 0.4 MG/1
1 CAPSULE ORAL NIGHTLY
Qty: 10 CAPSULE | Refills: 0 | Status: SHIPPED | OUTPATIENT
Start: 2021-10-20 | End: 2022-02-15

## 2021-10-20 RX ORDER — KETOROLAC TROMETHAMINE 15 MG/ML
15 INJECTION, SOLUTION INTRAMUSCULAR; INTRAVENOUS ONCE
Status: COMPLETED | OUTPATIENT
Start: 2021-10-20 | End: 2021-10-20

## 2021-10-20 RX ORDER — ONDANSETRON 2 MG/ML
4 INJECTION INTRAMUSCULAR; INTRAVENOUS ONCE
Status: COMPLETED | OUTPATIENT
Start: 2021-10-20 | End: 2021-10-20

## 2021-10-20 RX ORDER — SODIUM CHLORIDE 0.9 % (FLUSH) 0.9 %
10 SYRINGE (ML) INJECTION AS NEEDED
Status: DISCONTINUED | OUTPATIENT
Start: 2021-10-20 | End: 2021-10-20 | Stop reason: HOSPADM

## 2021-10-20 RX ORDER — ONDANSETRON 8 MG/1
8 TABLET, ORALLY DISINTEGRATING ORAL EVERY 8 HOURS PRN
Qty: 20 TABLET | Refills: 0 | Status: SHIPPED | OUTPATIENT
Start: 2021-10-20 | End: 2022-02-15

## 2021-10-20 RX ADMIN — ONDANSETRON 4 MG: 2 INJECTION INTRAMUSCULAR; INTRAVENOUS at 15:21

## 2021-10-20 RX ADMIN — LIDOCAINE HYDROCHLORIDE 150 MG: 10 INJECTION, SOLUTION INFILTRATION; PERINEURAL at 16:01

## 2021-10-20 RX ADMIN — KETOROLAC TROMETHAMINE 15 MG: 15 INJECTION, SOLUTION INTRAMUSCULAR; INTRAVENOUS at 15:20

## 2021-10-20 NOTE — DISCHARGE INSTRUCTIONS
Continue take your Norco at home as needed for pain.  Take Zofran as needed for nausea vomiting.  Take Flomax to help with urine flow    Follow-up with primary care for further management evaluation  Follow-up with urology for further evaluation and treatment, recommend make an appointment with Dr. Melendez next week.    Return to the ER for new or worsening symptoms

## 2021-10-20 NOTE — ED NOTES
Pt reports right sided flank pain, blood in urine, and scrotal pain. Has a history of kidney stones. Current symptoms have lasted for 3 days now. Pt reports the pain is worse at night when he is lying flat.     Cesar Marley RN  10/20/21 [id number]

## 2021-10-20 NOTE — ED PROVIDER NOTES
Subjective   Chief Complaint: Right low back pain, blood in urine x 3 days    Patient is a 36-year-old  male with a past medical history of kidney stones presents to the ED for evaluation of low mid back pain, testicular pain, blood in urine, and nausea x3 days.  Patient states his pain is a 4/10 constant stabbing that starts in his back and radiates into his groin.  He recently underwent foot surgery and patient states he has been taking hydrocodone 7.5 mg for pain.  Last dose was at 10 AM this morning.  He admits to hematuria, nausea, and chills.  Patient denies fever, vomiting, abdominal pain, diarrhea, chest pain, shortness of breath.    Location: Right low back, right flank    Quality: Stabbing    Duration: 3 days    Timing: Constant    Severity: Moderate    Associated Symptoms: Nausea, chills    PCP: Melinda Connell      History provided by:  Patient      Review of Systems   Constitutional: Positive for chills. Negative for fever.   HENT: Negative for sore throat and trouble swallowing.    Respiratory: Negative for shortness of breath and wheezing.    Cardiovascular: Negative for chest pain.   Gastrointestinal: Positive for nausea. Negative for abdominal pain, diarrhea and vomiting.   Genitourinary: Positive for flank pain and hematuria. Negative for dysuria.   Musculoskeletal: Negative for myalgias.   Skin: Negative for rash.   Neurological: Negative for weakness and headaches.   Psychiatric/Behavioral: Negative for behavioral problems.   All other systems reviewed and are negative.      No past medical history on file.    No Known Allergies    No past surgical history on file.    No family history on file.    Social History     Socioeconomic History   • Marital status: Single           Objective   Physical Exam  Vitals and nursing note reviewed.   Constitutional:       General: He is not in acute distress.     Appearance: Normal appearance. He is normal weight. He is not diaphoretic.   HENT:       "Head: Normocephalic and atraumatic.      Nose: Nose normal.      Mouth/Throat:      Mouth: Mucous membranes are moist.   Eyes:      Extraocular Movements: Extraocular movements intact.      Pupils: Pupils are equal, round, and reactive to light.   Cardiovascular:      Rate and Rhythm: Normal rate and regular rhythm.      Pulses: Normal pulses.      Heart sounds: Normal heart sounds. No murmur heard.      Pulmonary:      Effort: Pulmonary effort is normal.      Breath sounds: Normal breath sounds.   Abdominal:      General: Abdomen is flat.      Tenderness: There is right CVA tenderness. There is no left CVA tenderness.   Musculoskeletal:         General: Normal range of motion.   Skin:     General: Skin is warm.      Capillary Refill: Capillary refill takes less than 2 seconds.   Neurological:      General: No focal deficit present.      Mental Status: He is alert and oriented to person, place, and time.      Cranial Nerves: No cranial nerve deficit.   Psychiatric:         Mood and Affect: Mood normal.         Behavior: Behavior normal.         Procedures           ED Course  ED Course as of 10/20/21 1618   Wed Oct 20, 2021   1600 Patient reevaluated, reports he feels somewhat better.  Notified of lab work and imaging results. [MM]      ED Course User Index  [MM] Julia Siu PA    /98 (BP Location: Right arm, Patient Position: Sitting)   Pulse 83   Temp 98.2 °F (36.8 °C) (Oral)   Resp 18   Ht 188 cm (74\")   Wt 121 kg (266 lb 3.2 oz)   SpO2 94%   BMI 34.18 kg/m²   Labs Reviewed   COMPREHENSIVE METABOLIC PANEL - Abnormal; Notable for the following components:       Result Value    Glucose 107 (*)     CO2 21.0 (*)     Calcium 8.3 (*)     ALT (SGPT) 205 (*)     AST (SGOT) 69 (*)     All other components within normal limits    Narrative:     GFR Normal >60  Chronic Kidney Disease <60  Kidney Failure <15     URINALYSIS W/ CULTURE IF INDICATED - Abnormal; Notable for the following components:    Blood, " UA Moderate (2+) (*)     All other components within normal limits   CBC WITH AUTO DIFFERENTIAL - Abnormal; Notable for the following components:    MCHC 35.8 (*)     All other components within normal limits   URINALYSIS, MICROSCOPIC ONLY - Abnormal; Notable for the following components:    RBC, UA Too Numerous to Count (*)     WBC, UA 0-2 (*)     All other components within normal limits   LIPASE - Normal   CBC AND DIFFERENTIAL    Narrative:     The following orders were created for panel order CBC & Differential.  Procedure                               Abnormality         Status                     ---------                               -----------         ------                     CBC Auto Differential[[id number]]        Abnormal            Final result                 Please view results for these tests on the individual orders.     Medications   sodium chloride 0.9 % flush 10 mL (has no administration in time range)   ketorolac (TORADOL) injection 15 mg (15 mg Intravenous Given 10/20/21 1520)   ondansetron (ZOFRAN) injection 4 mg (4 mg Intravenous Given 10/20/21 1521)   lidocaine (XYLOCAINE) 1 % 150 mg in sodium chloride 0.9 % 100 mL IVPB (150 mg Intravenous Given 10/20/21 1601)     CT Abdomen Pelvis Stone Protocol    Result Date: 10/20/2021   1. 5 mm right uteropelvic junction stone without significant hydronephrosis. Tiny nonobstructing right renal stone. 2. Hepatomegaly with severe diffuse hepatic steatosis. Hepatic hemangiomas are seen to better advantage on previous MRI.    Electronically Signed By-Caren Moore MD On:10/20/2021 3:48 PM This report was finalized on [id number] by  Caren Moore MD.                                           MDM  Number of Diagnoses or Management Options  Flank pain  Hematuria, unspecified type  Nausea and vomiting, intractability of vomiting not specified, unspecified vomiting type  Ureterolithiasis  Diagnosis management comments: MEDICAL DECISION  Epic Chart  Review:  Comorbidities: History of kidney stones  Differentials: Kidney stone, appendicitis, diverticulitis, UTI; this list is not all inclusive and does not constitute the entirety of considered causes  Radiology interpretation:  Images reviewed by me and interpreted by radiologist, as above  Lab interpretation:  Labs viewed by me significant for, as above    While in the ED IV was placed and labs were obtained appropriate PPE was worn during exam and throughout all encounters with the patient.  Patient had the above evaluation.  IV established, lab work obtained.  Patient given Toradol, Zofran, lidocaine infusion for pain.  Patient reported improvement in his pain after these medications.  Lab work relatively unremarkable, urinalysis reveals hematuria, no UTI.  CT abdomen pelvis significant for a 5 mm right uteropelvic junction stone without hydronephrosis.  Findings discussed with patient at bedside.  Patient states he has pain medication at home, he is discharged with prescription for Flomax and Zofran and encouraged follow-up with urology in 2 to 3 days for recheck.  Patient given urine strainer at discharge.    Discharge plan and instructions were discussed with the patient who verbalized understanding and is in agreement with the plan, all questions were answered at this time.  Patient is aware of signs symptoms that would require immediate return to the emergency room.  Patient understands importance of following up with primary care provider for further evaluation and worsening concerns as well as blood pressure recheck in the next 4 weeks.    Patient was discharged in improved stable condition with an upright steady gait.         Amount and/or Complexity of Data Reviewed  Clinical lab tests: reviewed and ordered  Tests in the radiology section of CPT®: reviewed and ordered    Patient Progress  Patient progress: stable      Final diagnoses:   Ureterolithiasis   Flank pain   Nausea and vomiting,  intractability of vomiting not specified, unspecified vomiting type   Hematuria, unspecified type       ED Disposition  ED Disposition     ED Disposition Condition Comment    Discharge Stable           KoMelinda, APRN  [address] [facility] [place] [address]  [phone]    Schedule an appointment as soon as possible for a visit in 2 days  As needed, If symptoms worsen    Rajesh Melendez MD  [facility] [address] [place] [id number] [phone]    Schedule an appointment as soon as possible for a visit in 2 days  As needed, If symptoms worsen         Medication List      New Prescriptions    ondansetron ODT 8 MG disintegrating tablet  Commonly known as: Zofran ODT  Place 1 tablet under the tongue Every 8 (Eight) Hours As Needed for Nausea or Vomiting.     tamsulosin 0.4 MG capsule 24 hr capsule  Commonly known as: FLOMAX  Take 1 capsule by mouth Every Night.           Where to Get Your Medications      These medications were sent to [id number] - [place], [place] - [address] - [phone] [facility] [phone] FX  [address] [facility] [place] [address]    Phone: [phone]   · ondansetron ODT 8 MG disintegrating tablet  · tamsulosin 0.4 MG capsule 24 hr capsule          Julia Siu PA  10/20/21 5323

## 2021-10-29 ENCOUNTER — OFFICE (OUTPATIENT)
Dept: URBAN - METROPOLITAN AREA CLINIC 64 | Facility: CLINIC | Age: 36
End: 2021-10-29
Payer: COMMERCIAL

## 2021-10-29 VITALS
SYSTOLIC BLOOD PRESSURE: 126 MMHG | WEIGHT: 252 LBS | DIASTOLIC BLOOD PRESSURE: 96 MMHG | HEIGHT: 74 IN | HEART RATE: 82 BPM

## 2021-10-29 DIAGNOSIS — K76.0 FATTY (CHANGE OF) LIVER, NOT ELSEWHERE CLASSIFIED: ICD-10-CM

## 2021-10-29 DIAGNOSIS — R74.01 ELEVATION OF LEVELS OF LIVER TRANSAMINASE LEVELS: ICD-10-CM

## 2021-10-29 DIAGNOSIS — E78.00 PURE HYPERCHOLESTEROLEMIA, UNSPECIFIED: ICD-10-CM

## 2021-10-29 DIAGNOSIS — E78.1 PURE HYPERGLYCERIDEMIA: ICD-10-CM

## 2021-10-29 PROCEDURE — 99214 OFFICE O/P EST MOD 30 MIN: CPT | Performed by: NURSE PRACTITIONER

## 2021-12-06 PROCEDURE — 82365 CALCULUS SPECTROSCOPY: CPT

## 2021-12-07 ENCOUNTER — LAB REQUISITION (OUTPATIENT)
Dept: LAB | Facility: HOSPITAL | Age: 36
End: 2021-12-07

## 2021-12-07 DIAGNOSIS — N20.1 CALCULUS OF URETER: ICD-10-CM

## 2021-12-11 LAB
CALCIUM OXALATE DIHYDRATE MFR STONE IR: 20 %
COLOR STONE: NORMAL
COM MFR STONE: 80 %
COMPN STONE: NORMAL
LABORATORY COMMENT REPORT: NORMAL
Lab: NORMAL
Lab: NORMAL
PHOTO: NORMAL
SIZE STONE: NORMAL MM
SPEC SOURCE SUBJ: NORMAL
WT STONE: 86 MG

## 2022-02-11 ENCOUNTER — APPOINTMENT (OUTPATIENT)
Dept: GENERAL RADIOLOGY | Facility: HOSPITAL | Age: 37
End: 2022-02-11

## 2022-02-11 ENCOUNTER — HOSPITAL ENCOUNTER (EMERGENCY)
Facility: HOSPITAL | Age: 37
Discharge: HOME | End: 2022-02-11
Attending: EMERGENCY MEDICINE | Admitting: EMERGENCY MEDICINE

## 2022-02-11 VITALS
HEART RATE: 72 BPM | DIASTOLIC BLOOD PRESSURE: 79 MMHG | OXYGEN SATURATION: 96 % | WEIGHT: 253.97 LBS | BODY MASS INDEX: 32.59 KG/M2 | HEIGHT: 74 IN | TEMPERATURE: 98 F | SYSTOLIC BLOOD PRESSURE: 123 MMHG | RESPIRATION RATE: 16 BRPM

## 2022-02-11 DIAGNOSIS — S70.212A ABRASION OF LEFT HIP, INITIAL ENCOUNTER: ICD-10-CM

## 2022-02-11 DIAGNOSIS — S52.022A CLOSED FRACTURE OF OLECRANON PROCESS OF LEFT ULNA, INITIAL ENCOUNTER: ICD-10-CM

## 2022-02-11 DIAGNOSIS — M25.559 HIP PAIN: ICD-10-CM

## 2022-02-11 DIAGNOSIS — S80.212A ABRASION OF LEFT KNEE, INITIAL ENCOUNTER: ICD-10-CM

## 2022-02-11 DIAGNOSIS — M25.562 ACUTE PAIN OF LEFT KNEE: ICD-10-CM
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
## 2022-02-11 PROCEDURE — 96376 TX/PRO/DX INJ SAME DRUG ADON: CPT

## 2022-02-11 PROCEDURE — 73080 X-RAY EXAM OF ELBOW: CPT

## 2022-02-11 PROCEDURE — 96375 TX/PRO/DX INJ NEW DRUG ADDON: CPT

## 2022-02-11 PROCEDURE — 73502 X-RAY EXAM HIP UNI 2-3 VIEWS: CPT

## 2022-02-11 PROCEDURE — 99283 EMERGENCY DEPT VISIT LOW MDM: CPT

## 2022-02-11 PROCEDURE — 96374 THER/PROPH/DIAG INJ IV PUSH: CPT

## 2022-02-11 PROCEDURE — 0 MORPHINE SULFATE 4 MG/ML SOLUTION: Performed by: PHYSICIAN ASSISTANT

## 2022-02-11 PROCEDURE — 73562 X-RAY EXAM OF KNEE 3: CPT

## 2022-02-11 PROCEDURE — 25010000002 ONDANSETRON PER 1 MG: Performed by: PHYSICIAN ASSISTANT

## 2022-02-11 RX ORDER — ONDANSETRON 2 MG/ML
4 INJECTION INTRAMUSCULAR; INTRAVENOUS ONCE
Status: COMPLETED | OUTPATIENT
Start: 2022-02-11 | End: 2022-02-11

## 2022-02-11 RX ORDER — MORPHINE SULFATE 4 MG/ML
4 INJECTION, SOLUTION INTRAMUSCULAR; INTRAVENOUS ONCE
Status: COMPLETED | OUTPATIENT
Start: 2022-02-11 | End: 2022-02-11

## 2022-02-11 RX ORDER — SODIUM CHLORIDE 0.9 % (FLUSH) 0.9 %
10 SYRINGE (ML) INJECTION AS NEEDED
Status: DISCONTINUED | OUTPATIENT
Start: 2022-02-11 | End: 2022-02-11 | Stop reason: HOSPADM

## 2022-02-11 RX ORDER — HYDROCODONE BITARTRATE AND ACETAMINOPHEN 7.5; 325 MG/1; MG/1
1-2 TABLET ORAL EVERY 6 HOURS PRN
Qty: 15 TABLET | Refills: 0 | Status: ON HOLD | OUTPATIENT
Start: 2022-02-11 | End: 2022-02-18

## 2022-02-11 RX ADMIN — MORPHINE SULFATE 4 MG: 4 INJECTION INTRAVENOUS at 15:52

## 2022-02-11 RX ADMIN — ONDANSETRON 4 MG: 2 INJECTION INTRAMUSCULAR; INTRAVENOUS at 15:50

## 2022-02-11 RX ADMIN — MORPHINE SULFATE 4 MG: 4 INJECTION INTRAVENOUS at 17:19

## 2022-02-11 NOTE — DISCHARGE INSTRUCTIONS
Wear splint until otherwise specified by orthopedist.  Apply ice to painful areas for 20 minutes at a time for the next 72 hours help with pain and swelling.    Take Norco as needed for pain.  Do not operate heavy machinery or drink alcohol while taking this medication.  You may alternate this medication with ibuprofen as needed.    Follow-up with your primary care provider in 3-5 days.  If you do not have a primary care provider call [phone] for help in finding one, or you may follow up with [facility] at [phone].    Clean abrasions daily with soap and water pat dry and apply antibiotic ointment as desired.  Look for signs of infection including increased redness, purulent drainage, or fever.    Return to ED for any new or worsening symptoms
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
## 2022-02-15 ENCOUNTER — HOSPITAL ENCOUNTER (OUTPATIENT)
Dept: CARDIOLOGY | Facility: HOSPITAL | Age: 37
Discharge: HOME | End: 2022-02-15

## 2022-02-15 ENCOUNTER — LAB (OUTPATIENT)
Dept: LAB | Facility: HOSPITAL | Age: 37
End: 2022-02-15

## 2022-02-15 ENCOUNTER — PREP FOR SURGERY (OUTPATIENT)
Dept: OTHER | Facility: HOSPITAL | Age: 37
End: 2022-02-15

## 2022-02-15 ENCOUNTER — OFFICE VISIT (OUTPATIENT)
Dept: ORTHOPEDIC SURGERY | Facility: CLINIC | Age: 37
End: 2022-02-15

## 2022-02-15 VITALS — WEIGHT: 260 LBS | RESPIRATION RATE: 18 BRPM | BODY MASS INDEX: 33.37 KG/M2 | HEIGHT: 74 IN

## 2022-02-15 DIAGNOSIS — S52.022A CLOSED FRACTURE OF OLECRANON PROCESS OF LEFT ULNA, INITIAL ENCOUNTER: ICD-10-CM

## 2022-02-15 DIAGNOSIS — M25.532 LEFT WRIST PAIN: Primary | ICD-10-CM

## 2022-02-15 DIAGNOSIS — S52.022A CLOSED FRACTURE OF OLECRANON PROCESS OF LEFT ULNA, INITIAL ENCOUNTER: Primary | ICD-10-CM

## 2022-02-15 LAB
ABO GROUP BLD: NORMAL
APTT PPP: 27.1 SECONDS (ref 24–31)
BLD GP AB SCN SERPL QL: NEGATIVE
INR PPP: 1.02 (ref 0.93–1.1)
MRSA DNA SPEC QL NAA+PROBE: NORMAL
PROTHROMBIN TIME: 11.3 SECONDS (ref 9.6–11.7)
RH BLD: POSITIVE
T&S EXPIRATION DATE: NORMAL

## 2022-02-15 PROCEDURE — 80048 BASIC METABOLIC PNL TOTAL CA: CPT

## 2022-02-15 PROCEDURE — 93005 ELECTROCARDIOGRAM TRACING: CPT | Performed by: ORTHOPAEDIC SURGERY

## 2022-02-15 PROCEDURE — 36415 COLL VENOUS BLD VENIPUNCTURE: CPT

## 2022-02-15 PROCEDURE — 86901 BLOOD TYPING SEROLOGIC RH(D): CPT

## 2022-02-15 PROCEDURE — C9803 HOPD COVID-19 SPEC COLLECT: HCPCS

## 2022-02-15 PROCEDURE — 87641 MR-STAPH DNA AMP PROBE: CPT | Performed by: ORTHOPAEDIC SURGERY

## 2022-02-15 PROCEDURE — 99204 OFFICE O/P NEW MOD 45 MIN: CPT | Performed by: FAMILY MEDICINE

## 2022-02-15 PROCEDURE — 93010 ELECTROCARDIOGRAM REPORT: CPT | Performed by: INTERNAL MEDICINE

## 2022-02-15 PROCEDURE — 85730 THROMBOPLASTIN TIME PARTIAL: CPT

## 2022-02-15 PROCEDURE — 85025 COMPLETE CBC W/AUTO DIFF WBC: CPT

## 2022-02-15 PROCEDURE — 85610 PROTHROMBIN TIME: CPT

## 2022-02-15 PROCEDURE — 86900 BLOOD TYPING SEROLOGIC ABO: CPT

## 2022-02-15 PROCEDURE — 86850 RBC ANTIBODY SCREEN: CPT

## 2022-02-15 PROCEDURE — U0004 COV-19 TEST NON-CDC HGH THRU: HCPCS

## 2022-02-15 RX ORDER — VENLAFAXINE HYDROCHLORIDE 75 MG/1
75 CAPSULE, EXTENDED RELEASE ORAL DAILY
COMMUNITY
Start: 2022-01-21 | End: 2022-11-15

## 2022-02-15 RX ORDER — QUETIAPINE FUMARATE 200 MG/1
200 TABLET, FILM COATED ORAL NIGHTLY
COMMUNITY
Start: 2022-01-20 | End: 2022-11-15

## 2022-02-15 RX ORDER — ESKETAMINE HYDROCHLORIDE 28 MG/.2ML
84 SOLUTION NASAL WEEKLY
COMMUNITY
Start: 2022-01-27 | End: 2022-05-09

## 2022-02-15 RX ORDER — VENLAFAXINE HYDROCHLORIDE 150 MG/1
150 CAPSULE, EXTENDED RELEASE ORAL DAILY
COMMUNITY
Start: 2022-01-20 | End: 2022-11-15

## 2022-02-15 RX ORDER — PROPRANOLOL HCL 60 MG
60 CAPSULE, EXTENDED RELEASE 24HR ORAL AS NEEDED
COMMUNITY
Start: 2022-01-21 | End: 2022-11-15
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
## 2022-02-16 ENCOUNTER — TELEPHONE (OUTPATIENT)
Dept: ORTHOPEDIC SURGERY | Facility: CLINIC | Age: 37
End: 2022-02-16

## 2022-02-16 LAB
ANION GAP SERPL CALCULATED.3IONS-SCNC: 11.2 MMOL/L (ref 5–15)
BASOPHILS # BLD AUTO: 0.08 10*3/MM3 (ref 0–0.2)
BASOPHILS NFR BLD AUTO: 0.8 % (ref 0–1.5)
BUN SERPL-MCNC: 9 MG/DL (ref 6–20)
BUN/CREAT SERPL: 10.2 (ref 7–25)
CALCIUM SPEC-SCNC: 9.5 MG/DL (ref 8.6–10.5)
CHLORIDE SERPL-SCNC: 101 MMOL/L (ref 98–107)
CO2 SERPL-SCNC: 23.8 MMOL/L (ref 22–29)
CREAT SERPL-MCNC: 0.88 MG/DL (ref 0.76–1.27)
DEPRECATED RDW RBC AUTO: 39.7 FL (ref 37–54)
EOSINOPHIL # BLD AUTO: 0.39 10*3/MM3 (ref 0–0.4)
EOSINOPHIL NFR BLD AUTO: 4.1 % (ref 0.3–6.2)
ERYTHROCYTE [DISTWIDTH] IN BLOOD BY AUTOMATED COUNT: 12.2 % (ref 12.3–15.4)
GFR SERPL CREATININE-BSD FRML MDRD: 97 ML/MIN/1.73
GLUCOSE SERPL-MCNC: 91 MG/DL (ref 65–99)
HCT VFR BLD AUTO: 44 % (ref 37.5–51)
HGB BLD-MCNC: 15.6 G/DL (ref 13–17.7)
IMM GRANULOCYTES # BLD AUTO: 0.03 10*3/MM3 (ref 0–0.05)
IMM GRANULOCYTES NFR BLD AUTO: 0.3 % (ref 0–0.5)
LYMPHOCYTES # BLD AUTO: 2.77 10*3/MM3 (ref 0.7–3.1)
LYMPHOCYTES NFR BLD AUTO: 29.1 % (ref 19.6–45.3)
MCH RBC QN AUTO: 31.6 PG (ref 26.6–33)
MCHC RBC AUTO-ENTMCNC: 35.5 G/DL (ref 31.5–35.7)
MCV RBC AUTO: 89.2 FL (ref 79–97)
MONOCYTES # BLD AUTO: 0.88 10*3/MM3 (ref 0.1–0.9)
MONOCYTES NFR BLD AUTO: 9.3 % (ref 5–12)
NEUTROPHILS NFR BLD AUTO: 5.36 10*3/MM3 (ref 1.7–7)
NEUTROPHILS NFR BLD AUTO: 56.4 % (ref 42.7–76)
NRBC BLD AUTO-RTO: 0 /100 WBC (ref 0–0.2)
PLATELET # BLD AUTO: 405 10*3/MM3 (ref 140–450)
PMV BLD AUTO: 9.7 FL (ref 6–12)
POTASSIUM SERPL-SCNC: 4.1 MMOL/L (ref 3.5–5.2)
QT INTERVAL: 366 MS
RBC # BLD AUTO: 4.93 10*6/MM3 (ref 4.14–5.8)
SARS-COV-2 ORF1AB RESP QL NAA+PROBE: NOT DETECTED
SODIUM SERPL-SCNC: 136 MMOL/L (ref 136–145)
WBC NRBC COR # BLD: 9.51 10*3/MM3 (ref 3.4–10.8)

## 2022-02-16 NOTE — TELEPHONE ENCOUNTER
PATIENT CALLED STATING HE TALKED TO PAT TO OK TAKING MEDS BEFORE SURGERY THIS Friday. HE IS WANTING A REFILL FOR VICODIN FROM ER. PLEASE ADVISE.  [phone]

## 2022-02-17 RX ORDER — PROMETHAZINE HYDROCHLORIDE 12.5 MG/1
12.5 TABLET ORAL EVERY 6 HOURS PRN
Qty: 21 TABLET | Refills: 1 | Status: SHIPPED | OUTPATIENT
Start: 2022-02-17 | End: 2022-03-28

## 2022-02-17 RX ORDER — OXYCODONE HYDROCHLORIDE AND ACETAMINOPHEN 5; 325 MG/1; MG/1
1 TABLET ORAL EVERY 6 HOURS PRN
Qty: 28 TABLET | Refills: 0 | Status: SHIPPED | OUTPATIENT
Start: 2022-02-17 | End: 2022-03-28

## 2022-02-17 RX ORDER — CEPHALEXIN 500 MG/1
500 CAPSULE ORAL 4 TIMES DAILY
Qty: 4 CAPSULE | Refills: 0 | Status: SHIPPED | OUTPATIENT
Start: 2022-02-17 | End: 2022-02-18

## 2022-02-17 NOTE — TELEPHONE ENCOUNTER
Caller: RENE GUILLAUME    Relationship to patient: SIGNIFICANT OTHER    Best call back number: [phone]    Patient is needing: PATIENT'S SIGNIFICANT OTHER, RENE WAS CALLING TO CHECK ON THE STATUS OF PATIENT GETTING SOME PAIN MEDICATION UNTIL HIS SURGERY WITH DR. DE LA ROSA TOMORROW. RENE DID SAY SHE CALLED IN YESTERDAY TO REQUEST THIS PRESCRIPTION BUT NO ONE HAS EVER CALLED HER BACK. I TRIED TO WARM TRANSFER CALL TO THE CLINICAL STAFF BUT RECEIVED NO ANSWER. THANK YOU!

## 2022-02-18 ENCOUNTER — ANESTHESIA EVENT (OUTPATIENT)
Dept: PERIOP | Facility: HOSPITAL | Age: 37
End: 2022-02-18

## 2022-02-18 ENCOUNTER — APPOINTMENT (OUTPATIENT)
Dept: GENERAL RADIOLOGY | Facility: HOSPITAL | Age: 37
End: 2022-02-18

## 2022-02-18 ENCOUNTER — ANESTHESIA (OUTPATIENT)
Dept: PERIOP | Facility: HOSPITAL | Age: 37
End: 2022-02-18

## 2022-02-18 ENCOUNTER — PATIENT ROUNDING (BHMG ONLY) (OUTPATIENT)
Dept: ORTHOPEDIC SURGERY | Facility: CLINIC | Age: 37
End: 2022-02-18

## 2022-02-18 ENCOUNTER — HOSPITAL ENCOUNTER (OUTPATIENT)
Facility: HOSPITAL | Age: 37
Setting detail: HOSPITAL OUTPATIENT SURGERY
Discharge: HOME | End: 2022-02-18
Attending: ORTHOPAEDIC SURGERY | Admitting: ORTHOPAEDIC SURGERY

## 2022-02-18 VITALS
RESPIRATION RATE: 14 BRPM | OXYGEN SATURATION: 93 % | TEMPERATURE: 96.4 F | WEIGHT: 251.8 LBS | SYSTOLIC BLOOD PRESSURE: 125 MMHG | BODY MASS INDEX: 32.31 KG/M2 | HEIGHT: 74 IN | DIASTOLIC BLOOD PRESSURE: 77 MMHG | HEART RATE: 84 BPM

## 2022-02-18 DIAGNOSIS — S52.022A CLOSED FRACTURE OF OLECRANON PROCESS OF LEFT ULNA, INITIAL ENCOUNTER: Primary | ICD-10-CM

## 2022-02-18 PROCEDURE — C1889 IMPLANT/INSERT DEVICE, NOC: HCPCS | Performed by: ORTHOPAEDIC SURGERY

## 2022-02-18 PROCEDURE — 73070 X-RAY EXAM OF ELBOW: CPT

## 2022-02-18 PROCEDURE — 25010000002 MIDAZOLAM PER 1 MG: Performed by: ANESTHESIOLOGY

## 2022-02-18 PROCEDURE — 25010000002 HYDROMORPHONE PER 4 MG

## 2022-02-18 PROCEDURE — 76000 FLUOROSCOPY <1 HR PHYS/QHP: CPT

## 2022-02-18 PROCEDURE — 76942 ECHO GUIDE FOR BIOPSY: CPT | Performed by: ORTHOPAEDIC SURGERY

## 2022-02-18 PROCEDURE — 24685 OPTX ULNAR FX PROX END W/FIX: CPT | Performed by: ORTHOPAEDIC SURGERY

## 2022-02-18 PROCEDURE — 25010000002 FENTANYL CITRATE (PF) 100 MCG/2ML SOLUTION

## 2022-02-18 PROCEDURE — 25010000002 FENTANYL CITRATE (PF) 50 MCG/ML SOLUTION: Performed by: ANESTHESIOLOGY

## 2022-02-18 PROCEDURE — 24685 OPTX ULNAR FX PROX END W/FIX: CPT

## 2022-02-18 PROCEDURE — 25010000002 DEXAMETHASONE PER 1 MG: Performed by: ANESTHESIOLOGY

## 2022-02-18 PROCEDURE — 25010000002 PROPOFOL 200 MG/20ML EMULSION

## 2022-02-18 PROCEDURE — 25010000002 DIPHENHYDRAMINE PER 50 MG

## 2022-02-18 PROCEDURE — C1713 ANCHOR/SCREW BN/BN,TIS/BN: HCPCS | Performed by: ORTHOPAEDIC SURGERY

## 2022-02-18 PROCEDURE — 25010000002 ROPIVACAINE PER 1 MG: Performed by: ANESTHESIOLOGY

## 2022-02-18 PROCEDURE — 25010000002 ONDANSETRON PER 1 MG

## 2022-02-18 DEVICE — SUT TAPE W/TPR END 1/2 CIR TPR NDL 1.3MM 36IN: Type: IMPLANTABLE DEVICE | Site: ELBOW | Status: FUNCTIONAL

## 2022-02-18 DEVICE — IMPLANTABLE DEVICE: Type: IMPLANTABLE DEVICE | Site: ELBOW | Status: FUNCTIONAL

## 2022-02-18 RX ORDER — FLUMAZENIL 0.1 MG/ML
0.1 INJECTION INTRAVENOUS AS NEEDED
Status: DISCONTINUED | OUTPATIENT
Start: 2022-02-18 | End: 2022-02-18 | Stop reason: HOSPADM

## 2022-02-18 RX ORDER — FENTANYL CITRATE 50 UG/ML
25 INJECTION, SOLUTION INTRAMUSCULAR; INTRAVENOUS
Status: DISCONTINUED | OUTPATIENT
Start: 2022-02-18 | End: 2022-02-18 | Stop reason: HOSPADM

## 2022-02-18 RX ORDER — NALOXONE HCL 0.4 MG/ML
0.4 VIAL (ML) INJECTION AS NEEDED
Status: DISCONTINUED | OUTPATIENT
Start: 2022-02-18 | End: 2022-02-18 | Stop reason: HOSPADM

## 2022-02-18 RX ORDER — SODIUM CHLORIDE, SODIUM LACTATE, POTASSIUM CHLORIDE, CALCIUM CHLORIDE 600; 310; 30; 20 MG/100ML; MG/100ML; MG/100ML; MG/100ML
100 INJECTION, SOLUTION INTRAVENOUS CONTINUOUS
Status: DISCONTINUED | OUTPATIENT
Start: 2022-02-18 | End: 2022-02-18 | Stop reason: HOSPADM

## 2022-02-18 RX ORDER — ACETAMINOPHEN,DIPHENHYDRAMINE HCL 500; 25 MG/1; MG/1
2 TABLET, FILM COATED ORAL DAILY PRN
COMMUNITY
End: 2022-03-28

## 2022-02-18 RX ORDER — ROPIVACAINE HYDROCHLORIDE 5 MG/ML
INJECTION, SOLUTION EPIDURAL; INFILTRATION; PERINEURAL
Status: DISCONTINUED | OUTPATIENT
Start: 2022-02-18 | End: 2022-02-18 | Stop reason: SURG

## 2022-02-18 RX ORDER — SODIUM CHLORIDE, SODIUM LACTATE, POTASSIUM CHLORIDE, CALCIUM CHLORIDE 600; 310; 30; 20 MG/100ML; MG/100ML; MG/100ML; MG/100ML
INJECTION, SOLUTION INTRAVENOUS CONTINUOUS PRN
Status: DISCONTINUED | OUTPATIENT
Start: 2022-02-18 | End: 2022-02-18 | Stop reason: SURG

## 2022-02-18 RX ORDER — HYDROMORPHONE HCL 110MG/55ML
PATIENT CONTROLLED ANALGESIA SYRINGE INTRAVENOUS AS NEEDED
Status: DISCONTINUED | OUTPATIENT
Start: 2022-02-18 | End: 2022-02-18 | Stop reason: SURG

## 2022-02-18 RX ORDER — OXYCODONE HYDROCHLORIDE AND ACETAMINOPHEN 5; 325 MG/1; MG/1
1 TABLET ORAL EVERY 6 HOURS PRN
COMMUNITY
End: 2022-03-28

## 2022-02-18 RX ORDER — FENTANYL CITRATE 50 UG/ML
INJECTION, SOLUTION INTRAMUSCULAR; INTRAVENOUS
Status: COMPLETED | OUTPATIENT
Start: 2022-02-18 | End: 2022-02-18

## 2022-02-18 RX ORDER — DEXAMETHASONE SODIUM PHOSPHATE 4 MG/ML
INJECTION, SOLUTION INTRA-ARTICULAR; INTRALESIONAL; INTRAMUSCULAR; INTRAVENOUS; SOFT TISSUE
Status: DISCONTINUED | OUTPATIENT
Start: 2022-02-18 | End: 2022-02-18 | Stop reason: SURG

## 2022-02-18 RX ORDER — ONDANSETRON 2 MG/ML
INJECTION INTRAMUSCULAR; INTRAVENOUS AS NEEDED
Status: DISCONTINUED | OUTPATIENT
Start: 2022-02-18 | End: 2022-02-18 | Stop reason: SURG

## 2022-02-18 RX ORDER — HYDROMORPHONE HCL 110MG/55ML
0.5 PATIENT CONTROLLED ANALGESIA SYRINGE INTRAVENOUS
Status: DISCONTINUED | OUTPATIENT
Start: 2022-02-18 | End: 2022-02-18 | Stop reason: HOSPADM

## 2022-02-18 RX ORDER — MIDAZOLAM HYDROCHLORIDE 1 MG/ML
1 INJECTION INTRAMUSCULAR; INTRAVENOUS
Status: DISCONTINUED | OUTPATIENT
Start: 2022-02-18 | End: 2022-02-18 | Stop reason: HOSPADM

## 2022-02-18 RX ORDER — OXYCODONE HYDROCHLORIDE 5 MG/1
5 TABLET ORAL ONCE AS NEEDED
Status: DISCONTINUED | OUTPATIENT
Start: 2022-02-18 | End: 2022-02-18 | Stop reason: HOSPADM

## 2022-02-18 RX ORDER — EPHEDRINE SULFATE 5 MG/ML
5 INJECTION INTRAVENOUS ONCE AS NEEDED
Status: DISCONTINUED | OUTPATIENT
Start: 2022-02-18 | End: 2022-02-18 | Stop reason: HOSPADM

## 2022-02-18 RX ORDER — PROPOFOL 10 MG/ML
INJECTION, EMULSION INTRAVENOUS AS NEEDED
Status: DISCONTINUED | OUTPATIENT
Start: 2022-02-18 | End: 2022-02-18 | Stop reason: SURG

## 2022-02-18 RX ORDER — PROMETHAZINE HYDROCHLORIDE 25 MG/1
25 TABLET ORAL ONCE AS NEEDED
Status: DISCONTINUED | OUTPATIENT
Start: 2022-02-18 | End: 2022-02-18 | Stop reason: HOSPADM

## 2022-02-18 RX ORDER — ROCURONIUM BROMIDE 10 MG/ML
INJECTION, SOLUTION INTRAVENOUS AS NEEDED
Status: DISCONTINUED | OUTPATIENT
Start: 2022-02-18 | End: 2022-02-18 | Stop reason: SURG

## 2022-02-18 RX ORDER — DEXAMETHASONE SODIUM PHOSPHATE 4 MG/ML
INJECTION, SOLUTION INTRA-ARTICULAR; INTRALESIONAL; INTRAMUSCULAR; INTRAVENOUS; SOFT TISSUE
Status: COMPLETED | OUTPATIENT
Start: 2022-02-18 | End: 2022-02-18

## 2022-02-18 RX ORDER — FENTANYL CITRATE 50 UG/ML
INJECTION, SOLUTION INTRAMUSCULAR; INTRAVENOUS AS NEEDED
Status: DISCONTINUED | OUTPATIENT
Start: 2022-02-18 | End: 2022-02-18 | Stop reason: SURG

## 2022-02-18 RX ORDER — DIPHENHYDRAMINE HYDROCHLORIDE 50 MG/ML
12.5 INJECTION INTRAMUSCULAR; INTRAVENOUS
Status: DISCONTINUED | OUTPATIENT
Start: 2022-02-18 | End: 2022-02-18 | Stop reason: HOSPADM

## 2022-02-18 RX ORDER — LABETALOL HYDROCHLORIDE 5 MG/ML
5 INJECTION, SOLUTION INTRAVENOUS
Status: DISCONTINUED | OUTPATIENT
Start: 2022-02-18 | End: 2022-02-18 | Stop reason: HOSPADM

## 2022-02-18 RX ORDER — OXYCODONE HYDROCHLORIDE 5 MG/1
10 TABLET ORAL EVERY 4 HOURS PRN
Status: DISCONTINUED | OUTPATIENT
Start: 2022-02-18 | End: 2022-02-18 | Stop reason: HOSPADM

## 2022-02-18 RX ORDER — MIDAZOLAM HYDROCHLORIDE 1 MG/ML
INJECTION INTRAMUSCULAR; INTRAVENOUS
Status: COMPLETED | OUTPATIENT
Start: 2022-02-18 | End: 2022-02-18

## 2022-02-18 RX ORDER — MEPERIDINE HYDROCHLORIDE 25 MG/ML
12.5 INJECTION INTRAMUSCULAR; INTRAVENOUS; SUBCUTANEOUS
Status: DISCONTINUED | OUTPATIENT
Start: 2022-02-18 | End: 2022-02-18 | Stop reason: HOSPADM

## 2022-02-18 RX ORDER — LIDOCAINE HYDROCHLORIDE 10 MG/ML
INJECTION, SOLUTION EPIDURAL; INFILTRATION; INTRACAUDAL; PERINEURAL AS NEEDED
Status: DISCONTINUED | OUTPATIENT
Start: 2022-02-18 | End: 2022-02-18 | Stop reason: SURG

## 2022-02-18 RX ORDER — ROPIVACAINE HYDROCHLORIDE 5 MG/ML
INJECTION, SOLUTION EPIDURAL; INFILTRATION; PERINEURAL
Status: COMPLETED | OUTPATIENT
Start: 2022-02-18 | End: 2022-02-18

## 2022-02-18 RX ORDER — ONDANSETRON 2 MG/ML
4 INJECTION INTRAMUSCULAR; INTRAVENOUS ONCE AS NEEDED
Status: DISCONTINUED | OUTPATIENT
Start: 2022-02-18 | End: 2022-02-18 | Stop reason: HOSPADM

## 2022-02-18 RX ORDER — IPRATROPIUM BROMIDE AND ALBUTEROL SULFATE 2.5; .5 MG/3ML; MG/3ML
3 SOLUTION RESPIRATORY (INHALATION) ONCE AS NEEDED
Status: DISCONTINUED | OUTPATIENT
Start: 2022-02-18 | End: 2022-02-18 | Stop reason: HOSPADM

## 2022-02-18 RX ADMIN — FENTANYL CITRATE 50 MCG: 50 INJECTION INTRAMUSCULAR; INTRAVENOUS at 12:17

## 2022-02-18 RX ADMIN — HYDROMORPHONE HYDROCHLORIDE 0.2 MG: 2 INJECTION, SOLUTION INTRAMUSCULAR; INTRAVENOUS; SUBCUTANEOUS at 12:45

## 2022-02-18 RX ADMIN — HYDROMORPHONE HYDROCHLORIDE 0.2 MG: 2 INJECTION, SOLUTION INTRAMUSCULAR; INTRAVENOUS; SUBCUTANEOUS at 12:55

## 2022-02-18 RX ADMIN — ROCURONIUM BROMIDE 10 MG: 10 INJECTION INTRAVENOUS at 12:36

## 2022-02-18 RX ADMIN — ROPIVACAINE HYDROCHLORIDE 30 ML: 5 INJECTION EPIDURAL; INFILTRATION; PERINEURAL at 11:35

## 2022-02-18 RX ADMIN — OXYCODONE 10 MG: 5 TABLET ORAL at 14:17

## 2022-02-18 RX ADMIN — HYDROMORPHONE HYDROCHLORIDE 0.5 MG: 2 INJECTION, SOLUTION INTRAMUSCULAR; INTRAVENOUS; SUBCUTANEOUS at 14:40

## 2022-02-18 RX ADMIN — ROCURONIUM BROMIDE 50 MG: 10 INJECTION INTRAVENOUS at 11:49

## 2022-02-18 RX ADMIN — MIDAZOLAM 2 MG: 1 INJECTION INTRAMUSCULAR; INTRAVENOUS at 11:35

## 2022-02-18 RX ADMIN — SODIUM CHLORIDE, SODIUM LACTATE, POTASSIUM CHLORIDE, AND CALCIUM CHLORIDE: .6; .31; .03; .02 INJECTION, SOLUTION INTRAVENOUS at 11:43

## 2022-02-18 RX ADMIN — FENTANYL CITRATE 100 MCG: 50 INJECTION, SOLUTION INTRAMUSCULAR; INTRAVENOUS at 11:35

## 2022-02-18 RX ADMIN — PROPOFOL 30 MG: 10 INJECTION, EMULSION INTRAVENOUS at 12:41

## 2022-02-18 RX ADMIN — PROPOFOL 200 MG: 10 INJECTION, EMULSION INTRAVENOUS at 11:48

## 2022-02-18 RX ADMIN — HYDROMORPHONE HYDROCHLORIDE 1 MG: 2 INJECTION, SOLUTION INTRAMUSCULAR; INTRAVENOUS; SUBCUTANEOUS at 14:23

## 2022-02-18 RX ADMIN — ROPIVACAINE HYDROCHLORIDE 20 ML: 5 INJECTION, SOLUTION EPIDURAL; INFILTRATION; PERINEURAL at 15:05

## 2022-02-18 RX ADMIN — DEXAMETHASONE SODIUM PHOSPHATE 4 MG: 4 INJECTION, SOLUTION INTRAMUSCULAR; INTRAVENOUS at 11:35

## 2022-02-18 RX ADMIN — SUGAMMADEX 200 MG: 100 INJECTION, SOLUTION INTRAVENOUS at 13:11

## 2022-02-18 RX ADMIN — SUGAMMADEX 200 MG: 100 INJECTION, SOLUTION INTRAVENOUS at 13:39

## 2022-02-18 RX ADMIN — CEFAZOLIN SODIUM 2 G: 1 INJECTION, POWDER, FOR SOLUTION INTRAMUSCULAR; INTRAVENOUS at 11:58

## 2022-02-18 RX ADMIN — FENTANYL CITRATE 50 MCG: 50 INJECTION INTRAMUSCULAR; INTRAVENOUS at 12:01

## 2022-02-18 RX ADMIN — HYDROMORPHONE HYDROCHLORIDE 0.4 MG: 2 INJECTION, SOLUTION INTRAMUSCULAR; INTRAVENOUS; SUBCUTANEOUS at 13:22

## 2022-02-18 RX ADMIN — PROPOFOL 50 MG: 10 INJECTION, EMULSION INTRAVENOUS at 13:14

## 2022-02-18 RX ADMIN — HYDROMORPHONE HYDROCHLORIDE 0.5 MG: 2 INJECTION, SOLUTION INTRAMUSCULAR; INTRAVENOUS; SUBCUTANEOUS at 14:04

## 2022-02-18 RX ADMIN — DEXAMETHASONE SODIUM PHOSPHATE 4 MG: 4 INJECTION, SOLUTION INTRA-ARTICULAR; INTRALESIONAL; INTRAMUSCULAR; INTRAVENOUS; SOFT TISSUE at 15:05

## 2022-02-18 RX ADMIN — LIDOCAINE HYDROCHLORIDE 50 MG: 10 INJECTION, SOLUTION EPIDURAL; INFILTRATION; INTRACAUDAL; PERINEURAL at 11:48

## 2022-02-18 RX ADMIN — ONDANSETRON 4 MG: 2 INJECTION INTRAMUSCULAR; INTRAVENOUS at 12:51

## 2022-02-18 RX ADMIN — HYDROMORPHONE HYDROCHLORIDE 0.2 MG: 2 INJECTION, SOLUTION INTRAMUSCULAR; INTRAVENOUS; SUBCUTANEOUS at 12:30

## 2022-02-18 RX ADMIN — DIPHENHYDRAMINE HYDROCHLORIDE 12.5 MG: 50 INJECTION, SOLUTION INTRAMUSCULAR; INTRAVENOUS at 14:42

## 2022-02-18 NOTE — ANESTHESIA PROCEDURE NOTES
Peripheral Block    Pre-sedation assessment completed: 2/18/2022 11:30 AM    Patient reassessed immediately prior to procedure    Patient location during procedure: pre-op  Start time: 2/18/2022 11:30 AM  Stop time: 2/18/2022 11:35 AM  Reason for block: at surgeon's request and post-op pain management  Performed by  Anesthesiologist: Louise Quiroz MD  Assisted by: Isra Carballo RN  Preanesthetic Checklist  Completed: patient identified, IV checked, site marked, risks and benefits discussed, surgical consent, monitors and equipment checked, pre-op evaluation and timeout performed  Prep:  Pt Position: sitting  Sterile barriers:cap, gloves and sterile barriers  Prep: ChloraPrep  Patient monitoring: blood pressure monitoring, continuous pulse oximetry and EKG  Procedure    Sedation: yes  Performed under: local infiltration  Guidance:ultrasound guided    ULTRASOUND INTERPRETATION. Using ultrasound guidance a gauge needle was placed in close proximity to the brachial plexus nerve, at which point, under ultrasound guidance anesthetic was injected in the area of the nerve and spread of the anesthesia was seen on ultrasound in close proximity thereto.  There were no abnormalities seen on ultrasound; a digital image was taken; and the patient tolerated the procedure with no complications. Images:still images obtained, printed/placed on chart    Laterality:left  Block Type:supraclavicular  Injection Technique:single-shot  Needle Type:echogenic  Needle Gauge:19 G  Resistance on Injection: none  Sedation medications used: midazolam (VERSED) injection, 2 mg  fentaNYL citrate (PF) (SUBLIMAZE) injection, 100 mcg  Medications Used: dexamethasone (DECADRON) injection, 4 mg  ropivacaine (NAROPIN) 0.5 % injection, 30 mL  Med administered at 2/18/2022 11:35 AM      Post Assessment  Injection Assessment: negative aspiration for heme, no paresthesia on injection and incremental injection  Patient Tolerance:comfortable throughout  block  Complications:no

## 2022-02-18 NOTE — PROGRESS NOTES
A My-Chart message has been sent to the patient for PATIENT ROUNDING with [facility]

## 2022-02-18 NOTE — ANESTHESIA PROCEDURE NOTES
Airway  Urgency: elective    Date/Time: 2/18/2022 11:50 AM  Airway not difficult    General Information and Staff    Patient location during procedure: OR    Indications and Patient Condition  Indications for airway management: airway protection    Preoxygenated: yes  Mask difficulty assessment: 2 - vent by mask + OA or adjuvant +/- NMBA    Final Airway Details  Final airway type: endotracheal airway      Successful airway: ETT  Cuffed: yes   Successful intubation technique: direct laryngoscopy  Facilitating devices/methods: intubating stylet and cricoid pressure  Endotracheal tube insertion site: oral  Blade: Kaitlin  Blade size: 4  ETT size (mm): 7.5  Cormack-Lehane Classification: grade IIb - view of arytenoids or posterior of glottis only  Placement verified by: chest auscultation and capnometry   Inital cuff pressure (cm H2O): 22  Measured from: teeth  Number of attempts at approach: 1  Assessment: lips, teeth, and gum same as pre-op and atraumatic intubation

## 2022-02-18 NOTE — ANESTHESIA PROCEDURE NOTES
Peripheral Block    Pre-sedation assessment completed: 2/18/2022 2:40 PM    Patient reassessed immediately prior to procedure    Patient location during procedure: post-op  Start time: 2/18/2022 3:00 PM  Stop time: 2/18/2022 3:05 PM  Reason for block: at surgeon's request and post-op pain management  Performed by  Anesthesiologist: Louise Quiroz MD  Preanesthetic Checklist  Completed: patient identified, IV checked, site marked, risks and benefits discussed, surgical consent, monitors and equipment checked, pre-op evaluation and timeout performed  Prep:  Pt Position: sitting  Sterile barriers:cap, gloves and sterile barriers  Prep: ChloraPrep  Patient monitoring: blood pressure monitoring, continuous pulse oximetry and EKG  Procedure  Performed under: local infiltration  Guidance:ultrasound guided    ULTRASOUND INTERPRETATION. Using ultrasound guidance a gauge needle was placed in close proximity to the brachial plexus nerve, at which point, under ultrasound guidance anesthetic was injected in the area of the nerve and spread of the anesthesia was seen on ultrasound in close proximity thereto.  There were no abnormalities seen on ultrasound; a digital image was taken; and the patient tolerated the procedure with no complications. Images:still images obtained, printed/placed on chart    Laterality:left  Block Type:supraclavicular  Injection Technique:single-shot  Needle Type:echogenic  Needle Gauge:19 G  Resistance on Injection: none    Medications Used: ropivacaine (NAROPIN) 0.5 % injection, 20 mL  dexamethasone (DECADRON) injection, 4 mg  Med administered at 2/18/2022 3:05 PM      Post Assessment  Injection Assessment: negative aspiration for heme, no paresthesia on injection and incremental injection  Patient Tolerance:comfortable throughout block  Complications:no

## 2022-02-18 NOTE — ANESTHESIA PREPROCEDURE EVALUATION
Anesthesia Evaluation     Patient summary reviewed and Nursing notes reviewed   no history of anesthetic complications:  NPO Solid Status: > 8 hours  NPO Liquid Status: > 8 hours           Airway   Mallampati: II  TM distance: >3 FB  Neck ROM: full  No difficulty expected  Dental      Pulmonary - negative pulmonary ROS    breath sounds clear to auscultation  Cardiovascular - negative cardio ROS    ECG reviewed  Rhythm: regular  Rate: normal        Neuro/Psych  (+) psychiatric history Anxiety and Depression,    GI/Hepatic/Renal/Endo    (+)   liver disease fatty liver disease, renal disease stones,     Musculoskeletal (-) negative ROS    Abdominal     Abdomen: soft.   Substance History - negative use     OB/GYN negative ob/gyn ROS         Other - negative ROS                       Anesthesia Plan    ASA 2     general with block     intravenous induction     Anesthetic plan, all risks, benefits, and alternatives have been provided, discussed and informed consent has been obtained with: patient.    Plan discussed with CAA.        CODE STATUS:

## 2022-02-18 NOTE — ANESTHESIA POSTPROCEDURE EVALUATION
Patient: Abhijit Mckee    Procedure Summary     Date: 02/18/22 Room / Location: [facility] OR  / [facility] MAIN OR    Anesthesia Start: 1143 Anesthesia Stop: 1342    Procedure: olicrinon OPEN REDUCTION INTERNAL FIXATION (Left Elbow) Diagnosis:       Closed fracture of olecranon process of left ulna, initial encounter      (Closed fracture of olecranon process of left ulna, initial encounter [S52.022A])    Surgeons: Bradley Pimentel MD Provider: Louise Quiroz MD    Anesthesia Type: general with block ASA Status: 2          Anesthesia Type: general with block    Vitals  Vitals Value Taken Time   /73 02/18/22 1556   Temp 97.1 °F (36.2 °C) 02/18/22 1556   Pulse 79 02/18/22 1558   Resp 12 02/18/22 1556   SpO2 92 % 02/18/22 1558   Vitals shown include unvalidated device data.        Post Anesthesia Care and Evaluation    Patient location during evaluation: PACU  Patient participation: complete - patient participated  Level of consciousness: awake  Pain management: adequate  Airway patency: patent  Anesthetic complications: No anesthetic complications  PONV Status: controlled  Cardiovascular status: acceptable  Respiratory status: acceptable  Hydration status: acceptable    Comments: Patient with some residual pain post op. Performed another supraclavicular block in pacu with some improvement

## 2022-02-18 NOTE — OP NOTE
ELBOW OPEN REDUCTION INTERNAL FIXATION  Procedure Report    Patient Name:  Abhijit Mckee  YOB: 1985    Date of Surgery:  2/18/2022     Indications: This is a 36 y.o. male with an injury to the left elbow.  Imaging demonstrated olecranon fracture.Treatment options were discussed.  They desired to proceed with open reduction internal fixation after discussing the risks including bleeding, scarring,infection, stiffness, nerve damage, tendon damage, artery damage, continued pain, DVT, malunion, nonunion, loss of life or limb, and a need for further surgery including hardware removal.      Pre-op Diagnosis:   Closed fracture of olecranon process of left ulna, initial encounter [S52.022A]       Post-op Diagnosis:    Post-Op Diagnosis Codes:     * Closed fracture of olecranon process of left ulna, initial encounter [S52.022A]    Procedure/CPT® Codes: 37227    Procedure(s):  henry OPEN REDUCTION INTERNAL FIXATION    Assistant: Tracie Epps    was responsible for performing the following activities: Retraction, Suction, Irrigation, Suturing, Closing and Placing Dressing and their skilled assistance was necessary for the success of this case.         Anesthesia: General with Block    IVF: See anesthesia record    Estimated Blood Loss: minimal    Implants:    Implant Name Type Inv. Item Serial No.  Lot No. LRB No. Used Action   SUT TAPE W/TPR END 1/2 CIR TPR NDL 1.3MM 36IN - [id number] Implant SUT TAPE W/TPR END 1/2 CIR TPR NDL 1.3MM 36IN  [employer] [id number] Left 1 Implanted   SUT TAPE W/TPR END 1/2 CIR TPR NDL 1.3MM 36IN - [id number] Implant SUT TAPE W/TPR END 1/2 CIR TPR NDL 1.3MM 36IN  [employer] [id number] Left 1 Implanted   SUT/ANCH SUTURETAK BIOCOMP W/TPE SUT WHT/RAF 1.3MM 3X14.5MM - [id number] Implant SUT/ANCH SUTURETAK BIOCOMP W/TPE SUT WHT/RAF 1.3MM 3X14.5MM  [employer] [id number] Left 1 Implanted   SUT/ANCH PUSHLOCK PEEK 2.9X15.5MM - [id number] Implant SUT/ANCH PUSHLOCK PEEK 2.9X15.5MM  [employer]  [id number] Left 1 Implanted   SUT/ANCH PUSHLOCK PEEK 2.9X15.5MM - [id number] Implant SUT/ANCH PUSHLOCK PEEK 2.9X15.5MM  [employer] [id number] Left 1 Implanted   SUT/ANCH SUTURETAK BIOCOMP W/TPE SUT WHT/RAF 1.3MM 3X14.5MM - [id number] Implant SUT/ANCH SUTURETAK BIOCOMP W/TPE SUT WHT/RAF 1.3MM 3X14.5MM  [employer] [id number] Left 1 Implanted       Tourniquet: 49 minutes      Complications: None    Specimens:none    Description of Procedure: The patient's operative site was marked.  Regional  anesthesia was administered.  Patient was brought to the operating room and placed on the operating room table.  General anesthesia was administered. Antibiotics were dosed.  A timeout was taken to confirm the correct operative site and procedure.  Patient was placed in the lateral position and an axillary roll placed.  The arm was then prepped and draped in a standard surgical fashion and a sterile tourniquet placed in the upper arm.  Arm was exsanguinated and tourniquet inflated.    Incision was opened over the fracture site curving radially at the olecranon tip.  Full-thickness flaps are raised and the fracture was identified and hematoma evacuated.  Proximal fragment was quite small so elected for anchor fixation.  Locking stitch was used with a suture tape as well as a vertical mattress suture tape to correspond to just distal to the proximal tip.  Suture tack anchors were placed at the fracture margin and the distal fragment.  Traction was placed in the arm extended and vertical mattress sutures were passed from the suture tack anchors tied provisionally securing the reduction.  These were crisscrossed along with the whipstitch is to create a double row construct.  Fluoroscopy confirmed anatomic reduction of the fragment.  The second suture tape in the proximal fragment was then passed through a drill hole in the distal fragment further providing fixation    .  Wound was irrigated and closed in layers with suture and glue.   A sterile  dressing was applied.  Long-arm splint was placed after tourniquet release.  Patient was awakened and taken to the recovery room.  There were no complications.  I was present for all portions.  Counts were correct.  Good capillary refill was noted of the hand.        Bradley Pimentel MD     Date: 2/18/2022  Time: 12:59 EST

## 2022-02-18 NOTE — H&P
[facility]   HISTORY AND PHYSICAL    Patient Name: Abhijit Mckee  : 1985  MRN: [id number]  Primary Care Physician:  Melinda Connell APRN  Date of admission: 2022    Subjective   Subjective     Chief Complaint: Left elbow pain    This is a 37 yo gentleman who suffered a recent fall with a closed injury to the left elbow found to have an olecranon fracture.  Presenting for open reduction internal fixation    Review of Systems   Cardiovascular: Negative for chest pain.   Musculoskeletal: Positive for arthralgias.        Personal History     Past Medical History:   Diagnosis Date   • Depression with anxiety    • Kidney stones     stent in and removed- right   • Non-alcoholic fatty liver disease    • Suicidal ideation     nasal ketamine. meds for anxiety, in therapy       Past Surgical History:   Procedure Laterality Date   • CYSTOSCOPY, URETEROSCOPY, RETROGRADE PYELOGRAM, STENT INSERTION     • TOE SURGERY Left     5th toe   • TONSILLECTOMY         Family History: family history is not on file. Otherwise pertinent FHx was reviewed and not pertinent to current issue.    Social History:  reports that he has never smoked. He has never used smokeless tobacco. He reports previous alcohol use. He reports current drug use. Drug: Marijuana.    Home Medications:  Esketamine HCl (84 MG Dose), QUEtiapine, cephalexin, diphenhydrAMINE-acetaminophen, oxyCODONE-acetaminophen, promethazine, propranolol LA, and venlafaxine XR    Allergies:  No Known Allergies    Objective    Objective     Vitals:   Temp:  [97.4 °F (36.3 °C)] 97.4 °F (36.3 °C)  Heart Rate:  [74] 74  Resp:  [14] 14  BP: (130)/(94) 130/94    Left arm is in a well fitting splint.  He has mild swelling to the digits no pain on passive stretch of the digits  Sensory and motor exam are intact all distributions. Radial pulse is palpable and capillary refill is less than two seconds to all digits.      X-ray demonstrates avulsion of the triceps at the  olecranon      Impression  Left olecranon fracture  Plan is for open reduction internal fixation.  Discussed the possibility of plate versus anchor fixation and the differences and reoperation rates.  Risks and benefits of surgery including bleeding, scar, infection, stiffness, nerve tendon or artery damage, malunion,nonunion, DVT, repeat surgery including hardware removal, loss of life or limb were discussed. All questions were answered and addressed  Bradley Pimentel MD

## 2022-02-19 RX ORDER — KETOROLAC TROMETHAMINE 10 MG/1
10 TABLET, FILM COATED ORAL 3 TIMES DAILY
Qty: 15 TABLET | Refills: 0 | Status: SHIPPED | OUTPATIENT
Start: 2022-02-19 | End: 2022-02-24

## 2022-02-19 RX ORDER — KETOROLAC TROMETHAMINE 10 MG/1
10 TABLET, FILM COATED ORAL 3 TIMES DAILY
Qty: 15 TABLET | Refills: 0 | Status: SHIPPED | OUTPATIENT
Start: 2022-02-19 | End: 2022-02-19 | Stop reason: SDUPTHER

## 2022-02-19 NOTE — ANESTHESIA POSTPROCEDURE EVALUATION
Patient: Abhijit Mckee    Procedure Summary     Date: 02/18/22 Room / Location: [facility] OR  / [facility] MAIN OR    Anesthesia Start: 1143 Anesthesia Stop: 1342    Procedure: olicrinon OPEN REDUCTION INTERNAL FIXATION (Left Elbow) Diagnosis:       Closed fracture of olecranon process of left ulna, initial encounter      (Closed fracture of olecranon process of left ulna, initial encounter [S52.022A])    Surgeons: Bradley Pimentel MD Provider: Louise Quiroz MD    Anesthesia Type: general with block ASA Status: 2          Anesthesia Type: general with block    Vitals  Vitals Value Taken Time   /71 02/18/22 1558   Temp 97.1 °F (36.2 °C) 02/18/22 1556   Pulse 79 02/18/22 1558   Resp 12 02/18/22 1556   SpO2 92 % 02/18/22 1558   Vitals shown include unvalidated device data.        Post Anesthesia Care and Evaluation    Patient location during evaluation: PACU  Patient participation: complete - patient participated  Level of consciousness: awake  Pain scale: See nurse's notes for pain score.  Pain management: adequate  Airway patency: patent  Anesthetic complications: No anesthetic complications  PONV Status: none  Cardiovascular status: acceptable  Respiratory status: acceptable  Hydration status: acceptable    Comments: Patient seen and examined postoperatively; vital signs stable; SpO2 greater than or equal to 90%; cardiopulmonary status stable; nausea/vomiting adequately controlled; pain adequately controlled; no apparent anesthesia complications; patient discharged from anesthesia care when discharge criteria were met

## 2022-02-24 ENCOUNTER — TELEPHONE (OUTPATIENT)
Dept: ORTHOPEDIC SURGERY | Facility: CLINIC | Age: 37
End: 2022-02-24

## 2022-02-28 ENCOUNTER — OFFICE VISIT (OUTPATIENT)
Dept: ORTHOPEDIC SURGERY | Facility: CLINIC | Age: 37
End: 2022-02-28

## 2022-02-28 ENCOUNTER — TELEPHONE (OUTPATIENT)
Dept: ORTHOPEDIC SURGERY | Facility: CLINIC | Age: 37
End: 2022-02-28

## 2022-02-28 VITALS
DIASTOLIC BLOOD PRESSURE: 83 MMHG | BODY MASS INDEX: 32.73 KG/M2 | HEIGHT: 74 IN | SYSTOLIC BLOOD PRESSURE: 129 MMHG | WEIGHT: 255 LBS | HEART RATE: 85 BPM | OXYGEN SATURATION: 100 %

## 2022-02-28 DIAGNOSIS — S52.022A CLOSED FRACTURE OF OLECRANON PROCESS OF LEFT ULNA, INITIAL ENCOUNTER: Primary | ICD-10-CM

## 2022-02-28 DIAGNOSIS — Z47.89 ORTHOPEDIC AFTERCARE: ICD-10-CM

## 2022-02-28 PROCEDURE — 99024 POSTOP FOLLOW-UP VISIT: CPT | Performed by: ORTHOPAEDIC SURGERY

## 2022-02-28 PROCEDURE — 97760 ORTHOTIC MGMT&TRAING 1ST ENC: CPT | Performed by: ORTHOPAEDIC SURGERY

## 2022-02-28 NOTE — TELEPHONE ENCOUNTER
Patient called stating his dressing from surgery was falling down and hurt when he tried to put it back in place. I spoke to Ana about patient, she stated he needed to come in to have it redressed.

## 2022-02-28 NOTE — PROGRESS NOTES
"     Patient ID: Abhijit Mckee is a 36 y.o. male.  2/18/22 ORIF left olecranon with suture anchors  Splint started to loosen     Objective:    /83   Pulse 85   Ht 188 cm (74\")   Wt 116 kg (255 lb)   SpO2 100%   BMI 32.74 kg/m²     Physical Examination:  incision is healed no sign of infection  Sensory and motor exam are intact all distributions. Radial pulse is palpable and capillary refill is less than two seconds to all digits.    Imaging:      Assessment:  Doing well after left olecranon avulsion repair    Plan:  Staples removed remove Steri-Strips in 10 days.  Hinged elbow brace applied begin aggressive range of motion see me with x-ray in 3 to 4 weeks  Greater than 15 minutes was spent demonstrating proper fit and use of the device and signs to monitor for complications    Procedures  "

## 2022-03-03 ENCOUNTER — OFFICE (OUTPATIENT)
Dept: URBAN - METROPOLITAN AREA CLINIC 64 | Facility: CLINIC | Age: 37
End: 2022-03-03
Payer: COMMERCIAL

## 2022-03-03 VITALS
SYSTOLIC BLOOD PRESSURE: 119 MMHG | DIASTOLIC BLOOD PRESSURE: 89 MMHG | WEIGHT: 246 LBS | HEART RATE: 78 BPM | HEIGHT: 74 IN

## 2022-03-03 DIAGNOSIS — R94.5 ABNORMAL RESULTS OF LIVER FUNCTION STUDIES: ICD-10-CM

## 2022-03-03 PROCEDURE — 99213 OFFICE O/P EST LOW 20 MIN: CPT | Performed by: NURSE PRACTITIONER

## 2022-03-08 ENCOUNTER — TREATMENT (OUTPATIENT)
Dept: PHYSICAL THERAPY | Facility: CLINIC | Age: 37
End: 2022-03-08

## 2022-03-08 DIAGNOSIS — S52.022A CLOSED FRACTURE OF OLECRANON PROCESS OF LEFT ULNA, INITIAL ENCOUNTER: Primary | ICD-10-CM

## 2022-03-08 PROCEDURE — 97140 MANUAL THERAPY 1/> REGIONS: CPT | Performed by: PHYSICAL THERAPIST

## 2022-03-08 PROCEDURE — 97110 THERAPEUTIC EXERCISES: CPT | Performed by: PHYSICAL THERAPIST

## 2022-03-08 PROCEDURE — 97161 PT EVAL LOW COMPLEX 20 MIN: CPT | Performed by: PHYSICAL THERAPIST
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
## 2022-03-15 ENCOUNTER — TELEPHONE (OUTPATIENT)
Dept: ORTHOPEDIC SURGERY | Facility: CLINIC | Age: 37
End: 2022-03-15

## 2022-03-16 ENCOUNTER — TREATMENT (OUTPATIENT)
Dept: PHYSICAL THERAPY | Facility: CLINIC | Age: 37
End: 2022-03-16

## 2022-03-16 DIAGNOSIS — S52.022A CLOSED FRACTURE OF OLECRANON PROCESS OF LEFT ULNA, INITIAL ENCOUNTER: Primary | ICD-10-CM

## 2022-03-16 PROCEDURE — 97140 MANUAL THERAPY 1/> REGIONS: CPT | Performed by: PHYSICAL THERAPIST

## 2022-03-16 PROCEDURE — 97110 THERAPEUTIC EXERCISES: CPT | Performed by: PHYSICAL THERAPIST

## 2022-03-16 NOTE — PROGRESS NOTES
Physical Therapy Daily Progress Note    VISIT#: 2    Subjective   Abhijit Mckee reports that his arm has been sore since his last session with a deformity note in his elbow and reports that he was told his has an extensor restriction with his brace locked at 30 deg ext with unlimited flexion  Pain Rating (0/10): 5    Objective     See Exercise, Manual, and Modality Logs for complete treatment.     Patient Education: protocol restrictions    Assessment/Plan   Pt informed on surgery resitrictions opposite what pt originally thought with corrections made to brace fitting, and reviewed restrictions and pain management    Plan  Progress per Plan of Care and Progress strengthening /stabilization /functional activity            Timed:         Manual Therapy:    25     mins  63026;     Therapeutic Exercise:    15     mins  72977;     Neuromuscular Son:        mins  98976;    Therapeutic Activity:          mins  82489;     Gait Training:           mins  45646;     Ultrasound:          mins  06464;    Ionto                                   mins   60772  Self Care                            mins   20617    Un-Timed:  Electrical Stimulation:         mins  07690 ( );  Dry Needling          mins self-pay  Traction          mins 86906  Low Eval          Mins  22237  Mod Eval          Mins  56553  High Eval                            Mins  88082  Re-Eval                               mins  35306    Timed Treatment:   40   mins   Total Treatment:     40   mins    Gisselle White PT, DPT  Physical Therapist

## 2022-03-18 ENCOUNTER — TREATMENT (OUTPATIENT)
Dept: PHYSICAL THERAPY | Facility: CLINIC | Age: 37
End: 2022-03-18

## 2022-03-18 DIAGNOSIS — S52.022A CLOSED FRACTURE OF OLECRANON PROCESS OF LEFT ULNA, INITIAL ENCOUNTER: Primary | ICD-10-CM

## 2022-03-18 PROCEDURE — 97140 MANUAL THERAPY 1/> REGIONS: CPT | Performed by: PHYSICAL THERAPIST

## 2022-03-18 PROCEDURE — 97110 THERAPEUTIC EXERCISES: CPT | Performed by: PHYSICAL THERAPIST

## 2022-03-23 ENCOUNTER — TREATMENT (OUTPATIENT)
Dept: PHYSICAL THERAPY | Facility: CLINIC | Age: 37
End: 2022-03-23

## 2022-03-23 DIAGNOSIS — S52.022A CLOSED FRACTURE OF OLECRANON PROCESS OF LEFT ULNA, INITIAL ENCOUNTER: Primary | ICD-10-CM

## 2022-03-23 PROCEDURE — 97140 MANUAL THERAPY 1/> REGIONS: CPT | Performed by: PHYSICAL THERAPIST

## 2022-03-23 PROCEDURE — 97110 THERAPEUTIC EXERCISES: CPT | Performed by: PHYSICAL THERAPIST

## 2022-03-23 NOTE — PROGRESS NOTES
Physical Therapy Daily Progress Note    Patient:  Abhijit Mckee  :  1985  Referring practitioner:  Bradley Pimentel, *  Date of Initial Visit:  Type: THERAPY  Noted: 3/8/2022  Today's Date:  3/23/2022      Visit Diagnoses:    ICD-10-CM ICD-9-CM   1. Closed fracture of olecranon process of left ulna, initial encounter  S52.022A 813.01       VISIT#:  4 in POC.    Subjective   Abhijit Mckee reports he is doing ok.  Cont to have pain, jesenia into ext and even when at rest (jesenia when resting elbow on hard surfaces per olecranon tenderness).  Completes HEP as directed.      Objective   See exercise, manual, and modality logs for complete treatment.       ASSESSMENT  Pain with manual stretching into extension; ROM is improving well.  No pain into supination; ROM WFL by visual exam.  No tenderness with STM, but some tightness in muscle belly.  Min discomfort with scar massage/desen.    Pt tolerated new and progression of exercises / activities without problems.  No complications today.      PLAN  Continue current POC and progress as tolerated per PT evaluation.        Timed:  Manual Therapy:    25     mins  17198;  Therapeutic Exercise:    16     mins  70250;         Timed Treatment:   41   mins   Total Treatment:     41   mins      Sandor Gama, PT  [place] License # [id number]  Physical Therapist

## 2022-03-28 ENCOUNTER — OFFICE VISIT (OUTPATIENT)
Dept: ORTHOPEDIC SURGERY | Facility: CLINIC | Age: 37
End: 2022-03-28

## 2022-03-28 ENCOUNTER — TREATMENT (OUTPATIENT)
Dept: PHYSICAL THERAPY | Facility: CLINIC | Age: 37
End: 2022-03-28

## 2022-03-28 VITALS
WEIGHT: 255 LBS | BODY MASS INDEX: 32.73 KG/M2 | HEIGHT: 74 IN | DIASTOLIC BLOOD PRESSURE: 80 MMHG | HEART RATE: 69 BPM | SYSTOLIC BLOOD PRESSURE: 117 MMHG

## 2022-03-28 DIAGNOSIS — S52.022A CLOSED FRACTURE OF OLECRANON PROCESS OF LEFT ULNA, INITIAL ENCOUNTER: Primary | ICD-10-CM

## 2022-03-28 DIAGNOSIS — Z47.89 ORTHOPEDIC AFTERCARE: ICD-10-CM

## 2022-03-28 PROCEDURE — 99024 POSTOP FOLLOW-UP VISIT: CPT | Performed by: ORTHOPAEDIC SURGERY

## 2022-03-28 PROCEDURE — 97110 THERAPEUTIC EXERCISES: CPT | Performed by: PHYSICAL THERAPIST

## 2022-03-28 PROCEDURE — 97140 MANUAL THERAPY 1/> REGIONS: CPT | Performed by: PHYSICAL THERAPIST

## 2022-03-28 NOTE — PROGRESS NOTES
Physical Therapy Daily Progress Note    VISIT#: 5    Subjective   Abhijit Mckee reports that he has tightness and soreness into flexion but has been released from his brace and is doing well without it      Objective     See Exercise, Manual, and Modality Logs for complete treatment.     Patient Education: progression of therapy and POC    Assessment/Plan   Pt progressed in strengthening and ROM activities today with visual ROM WFL and pt able to tolerate progression to UBE at the end of the session with no increase in pain reported throughout the session.     Plan  Progress per Plan of Care and Progress strengthening /stabilization /functional activity            Timed:         Manual Therapy:    15     mins  82017;     Therapeutic Exercise:    25     mins  58239;     Neuromuscular Son:        mins  99382;    Therapeutic Activity:          mins  51109;     Gait Training:          mins  06981;     Ultrasound:          mins  69731;    Ionto                                   mins   07259  Self Care                            mins   33184    Un-Timed:  Electrical Stimulation:         mins  45567 ( );  Dry Needling         mins self-pay  Traction          mins 63533  Low Eval          Mins  40263  Mod Eval          Mins  36256  High Eval                            Mins  12171  Re-Eval                               mins  82051    Timed Treatment:   40   mins   Total Treatment:     40   mins    Gisselle White PT, DPT  Physical Therapist

## 2022-03-28 NOTE — PROGRESS NOTES
Patient ID: Abhijit Mckee is a 36 y.o. male.  2/18/22 ORIF left olecranon with suture anchors  Pain improving     Objective:    There were no vitals taken for this visit.    Physical Examination:    Incisions are healed elbow range of motion is 2 to 100 degrees with 80 degrees pronation supination    Imaging:  left Elbow X-Ray  Indication: Postop tricep avulsion repair  Views: AP and Lateral views  Findings: Fracture alignment with appropriate anchor insertion tunnels  no bony lesion  Soft tissues normal  not examined joint spaces  Hardware appropriately positioned yes      yes prior studies available for comparison.  Assessment:  Well after tricep avulsion repair    Plan:  Wean brace, continue therapy lifting restrictions discussed x-ray in 6 weeks    Procedures

## 2022-04-07 ENCOUNTER — TELEPHONE (OUTPATIENT)
Dept: ORTHOPEDIC SURGERY | Facility: CLINIC | Age: 37
End: 2022-04-07

## 2022-04-07 NOTE — TELEPHONE ENCOUNTER
Caller: RENE     Relationship to patient: S/O    Best call back number:     Patient is needing: THEY NEED PHYSICAL THERAPY ORDERS SENT TO CONTINUE SEEING THERAPIST

## 2022-04-14 ENCOUNTER — TELEPHONE (OUTPATIENT)
Dept: ORTHOPEDIC SURGERY | Facility: CLINIC | Age: 37
End: 2022-04-14

## 2022-04-14 NOTE — TELEPHONE ENCOUNTER
Provider: DR. DE LA ROSA    Caller: PATIENT    Relationship to Patient: SELF      Phone Number:  [phone]    Reason for Call: PT. HAD LEFT ELBOW SURGERY WITH DR. DE LA ROSA IN February 2022.   PT. STATES THAT HIS DOCTOR IS ORDERING AN MRI OF PITUITARY GLAND.   THEY TOLD HIM TO CHECK WITH DR. LEVIN TO MAKE SURE HIS ELBOW HARDWARE WOULDN'T INTERFERE WITH THE MRI.  PLEASE CALL TO ADVISE.

## 2022-04-19 ENCOUNTER — TREATMENT (OUTPATIENT)
Dept: PHYSICAL THERAPY | Facility: CLINIC | Age: 37
End: 2022-04-19

## 2022-04-19 DIAGNOSIS — S52.022A CLOSED FRACTURE OF OLECRANON PROCESS OF LEFT ULNA, INITIAL ENCOUNTER: Primary | ICD-10-CM

## 2022-04-19 PROCEDURE — 97140 MANUAL THERAPY 1/> REGIONS: CPT | Performed by: PHYSICAL THERAPIST

## 2022-04-19 PROCEDURE — 97110 THERAPEUTIC EXERCISES: CPT | Performed by: PHYSICAL THERAPIST

## 2022-04-19 NOTE — PROGRESS NOTES
Physical Therapy Daily Progress Note    VISIT#: 6    Subjective   Abhijit Mckee reports that he is doing well with slight difference in his LUE as compared to his RUE in ROM but otherwise is doing well  Pain Rating (0/10): 2    Objective     See Exercise, Manual, and Modality Logs for complete treatment.     Patient Education: progression of therapy and POC    Assessment/Plan   Pt continues to progress ROM, strengthening, and activity tolerance with minimal to no increase in pain throughout the session. Pt quickly fatigued with forearm strengthening activities and continues to lack roughly 8 degrees of extension.    Plan  Progress per Plan of Care and Progress strengthening /stabilization /functional activity            Timed:         Manual Therapy:    10     mins  62023;     Therapeutic Exercise:    35     mins  20764;     Neuromuscular Son:        mins  59710;    Therapeutic Activity:          mins  78619;     Gait Training:          mins  59612;     Ultrasound:          mins  87590;    Ionto                                   mins   05699  Self Care                            mins   27650    Un-Timed:  Electrical Stimulation:         mins  82509 ( );  Dry Needling          mins self-pay  Traction          mins 94465  Low Eval          Mins  41502  Mod Eval          Mins  98219  High Eval                           Mins  91005  Re-Eval                               mins  78280    Timed Treatment:   45   mins   Total Treatment:     45   mins    Gisselle White PT, DPT  Physical Therapist

## 2022-04-25 ENCOUNTER — TELEPHONE (OUTPATIENT)
Dept: PHYSICAL THERAPY | Facility: CLINIC | Age: 37
End: 2022-04-25

## 2022-04-29 ENCOUNTER — TREATMENT (OUTPATIENT)
Dept: PHYSICAL THERAPY | Facility: CLINIC | Age: 37
End: 2022-04-29

## 2022-04-29 DIAGNOSIS — S52.022A CLOSED FRACTURE OF OLECRANON PROCESS OF LEFT ULNA, INITIAL ENCOUNTER: Primary | ICD-10-CM

## 2022-04-29 PROCEDURE — 97140 MANUAL THERAPY 1/> REGIONS: CPT | Performed by: PHYSICAL THERAPIST

## 2022-04-29 PROCEDURE — 97110 THERAPEUTIC EXERCISES: CPT | Performed by: PHYSICAL THERAPIST

## 2022-04-29 NOTE — PROGRESS NOTES
Physical Therapy Daily Progress Note    VISIT#: 7    Subjective   Abhijit Mckee reports that he is doing well overall and did well following his previous session. Pt reports his shoulder has given him more issues than his elbow  Pain Rating (0/10): 0    Objective   L Elbow ROM:2-135  See Exercise, Manual, and Modality Logs for complete treatment.     Patient Education: progression of therapy and POC    Assessment/Plan   Pt continues to progress ROM, strengthening, and activity tolerance with minimal to no increase in pain throughout the session.     Plan  Progress per Plan of Care and Progress strengthening /stabilization /functional activity            Timed:         Manual Therapy:    10     mins  47082;     Therapeutic Exercise:    30     mins  97042;     Neuromuscular Son:        mins  91186;    Therapeutic Activity:          mins  16181;     Gait Training:           mins  45464;     Ultrasound:          mins  81150;    Ionto                                   mins   39249  Self Care                            mins   72826    Un-Timed:  Electrical Stimulation:         mins  78765 ( );  Dry Needling          mins self-pay  Traction          mins 99225  Low Eval          Mins  57677  Mod Eval          Mins  47732  High Eval                            Mins  01007  Re-Eval                               mins  71211    Timed Treatment:   40   mins   Total Treatment:     40   mins    Gisselle White PT, DPT  Physical Therapist

## 2022-05-02 ENCOUNTER — TELEPHONE (OUTPATIENT)
Dept: PHYSICAL THERAPY | Facility: CLINIC | Age: 37
End: 2022-05-02

## 2022-05-02 NOTE — TELEPHONE ENCOUNTER
NEEDS TO CHANGE HIS NEXT APPT  BECAUSE HE HAS A FOLLOW UP ON THE SAME  DAY WITH ORTHO. PLEASE CALL AND RESCHEDULE

## 2022-05-09 ENCOUNTER — OFFICE VISIT (OUTPATIENT)
Dept: ORTHOPEDIC SURGERY | Facility: CLINIC | Age: 37
End: 2022-05-09

## 2022-05-09 VITALS — BODY MASS INDEX: 32.73 KG/M2 | WEIGHT: 255 LBS | HEIGHT: 74 IN | TEMPERATURE: 98.5 F

## 2022-05-09 DIAGNOSIS — S52.022A CLOSED FRACTURE OF OLECRANON PROCESS OF LEFT ULNA, INITIAL ENCOUNTER: Primary | ICD-10-CM

## 2022-05-09 PROCEDURE — 99024 POSTOP FOLLOW-UP VISIT: CPT | Performed by: ORTHOPAEDIC SURGERY

## 2022-05-09 NOTE — PROGRESS NOTES
"     Patient ID: Abhijit Mckee is a 36 y.o. male.    2/18/22 ORIF left olecranon with suture anchors  Pain improving     Objective:    Temp 98.5 °F (36.9 °C)   Ht 188 cm (74\")   Wt 116 kg (255 lb)   BMI 32.74 kg/m²     Physical Examination:    Incision healed range of motion 0-1 30 minimal pain on resisted elbow extension    Imaging:  left Elbow X-Ray  Indication: Postop ORIF olecranon  Views: AP and Lateral views  Findings: Maintenance of fracture reduction hardware in position with good callus progression  no bony lesion  Soft tissues normal  normal joint spaces  Hardware appropriately positioned yes      no prior studies available for comparison.    Assessment:  Doing well after ORIF left olecranon    Plan:  Okay to begin light strengthening activities see me with x-rays in a month    Procedures  "

## 2022-05-10 ENCOUNTER — TREATMENT (OUTPATIENT)
Dept: PHYSICAL THERAPY | Facility: CLINIC | Age: 37
End: 2022-05-10

## 2022-05-10 DIAGNOSIS — S52.022A CLOSED FRACTURE OF OLECRANON PROCESS OF LEFT ULNA, INITIAL ENCOUNTER: Primary | ICD-10-CM

## 2022-05-10 PROCEDURE — 97110 THERAPEUTIC EXERCISES: CPT | Performed by: PHYSICAL THERAPIST

## 2022-05-10 NOTE — PROGRESS NOTES
Physical Therapy Daily Progress Note    VISIT#: 8    Subjective   Abhijit Mckee reports that he went back to working out yesterday with noted pain in elbow and shoulder today and is fatigued following ketamine treatment earlier today      Objective     See Exercise, Manual, and Modality Logs for complete treatment.     Patient Education: progression of therapy and POC    Assessment/Plan   Pt progressed wt bearing exercises on LUE today with increased pain with quadruped matrix and with elbow ext stretch but all other activities were tolerated well with pt instructed to perform exercises within pain-free range. Pt fatigued quickly throughout the session.    Plan  Progress per Plan of Care and Progress strengthening /stabilization /functional activity            Timed:         Manual Therapy:    5     mins  63759;     Therapeutic Exercise:    25     mins  53441;     Neuromuscular Son:        mins  28639;    Therapeutic Activity:          mins  57282;     Gait Training:           mins  11457;     Ultrasound:          mins  06171;    Ionto                                   mins   83809  Self Care                            mins   28128    Un-Timed:  Electrical Stimulation:         mins  11984 ( );  Dry Needling          mins self-pay  Traction          mins 14739  Low Eval          Mins  28860  Mod Eval          Mins  91924  High Eval                            Mins  52315  Re-Eval                               mins  07627    Timed Treatment:   30   mins   Total Treatment:     30   mins    Gisselle White PT, DPT  Physical Therapist

## 2022-06-09 ENCOUNTER — OFFICE VISIT (OUTPATIENT)
Dept: ORTHOPEDIC SURGERY | Facility: CLINIC | Age: 37
End: 2022-06-09

## 2022-06-09 VITALS
BODY MASS INDEX: 32.42 KG/M2 | HEIGHT: 74 IN | WEIGHT: 252.6 LBS | SYSTOLIC BLOOD PRESSURE: 144 MMHG | DIASTOLIC BLOOD PRESSURE: 89 MMHG

## 2022-06-09 DIAGNOSIS — M25.522 LEFT ELBOW PAIN: ICD-10-CM

## 2022-06-09 DIAGNOSIS — S52.022D CLOSED FRACTURE OF OLECRANON PROCESS OF LEFT ULNA WITH ROUTINE HEALING, SUBSEQUENT ENCOUNTER: Primary | ICD-10-CM

## 2022-06-09 PROCEDURE — 99213 OFFICE O/P EST LOW 20 MIN: CPT | Performed by: ORTHOPAEDIC SURGERY

## 2022-06-09 RX ORDER — ESCITALOPRAM OXALATE 10 MG/1
10 TABLET ORAL DAILY
COMMUNITY

## 2022-06-09 NOTE — PATIENT INSTRUCTIONS
Called and notified pt MRI follow-up instructions    Today at your office visit, Dr. Pimentel recommended an MRI (magnetic resonance imaging) to evaluate your joint pain.  This requires a precertification process, which our office will do, and then we will contact you when it is approved and go over scheduling options.  We typically recommend these to be performed at [facility] or [facility].  If for some reason it is performed elsewhere please arrange to have that facility give you a disc with your images on it so Dr. Pimentel can review it at your follow-up visit.    When checking out today we recommend making an appointment to go over your results in approximately two weeks.  If your MRI is done sooner than that we would be happy to schedule you sooner to go over your results, just contact us at [phone] or through the [employer] portal to let us know your MRI is completed.  Seeing you in person for the results gives us the best opportunity to look at your images together and explain the diagnosis and treatment options to best help you.

## 2022-06-09 NOTE — PROGRESS NOTES
"     Patient ID: Abhijit Mckee is a 36 y.o. male.  Left elbow pain  2/18/22 ORIF left olecranon with suture anchors  Was wrestling over the weekend when he felt a pop and developed some bruising over the left elbow    Review of Systems:  Left elbow pain      Objective:    /89   Ht 188 cm (74\")   Wt 115 kg (252 lb 9.6 oz)   BMI 32.43 kg/m²     Physical Examination:     Left elbow has healed incision mild swelling diffuse bruising over the posterior aspect of the lower arm.  There is some pain and bogginess over the tricep insertion he does have pretty good active tricep extension though.  Passive range of motion 0-1 30 insert normal neuro upper    Imaging:   left Elbow X-Ray  Indication: Left elbow pain history of olecranon fracture surgery  Views: AP and Lateral views  Findings: Healed olecranon fracture with absorbable anchor insertion positions unchanged  no bony lesion  Soft tissues normal  normal joint spaces  Hardware appropriately positioned yes      yes prior studies available for comparison.    Assessment:    Left elbow pain possible triceps injury    Plan:   I recommend MRI to evaluate his triceps      Procedures          Disclaimer: Part of this note may be an electronic transcription/translation of spoken language to printed text using the Dragon Dictation System  "

## 2022-07-05 ENCOUNTER — DOCUMENTATION (OUTPATIENT)
Dept: PHYSICAL THERAPY | Facility: CLINIC | Age: 37
End: 2022-07-05

## 2022-07-05 NOTE — PROGRESS NOTES
Discharge Summary  Discharge Summary from Physical Therapy Report      Date of Initial PT visit: 3/8/22  Number of Visits Seen: 8     Discharge Status of Patient:  SHORT TERM GOALS: Time for Goal Achievement: 4 weeks  1.  Patient to be compliant with and understand progression of HEP. -MET  2.  Increase  (L)radio ulnar, (L) humeral unlar  mobility to allow for improved mobility of elbow with less pain -MET  3.  Increased (L) UE strength to 4+/5 to allow for household activities, including lifting. -MET  4.  Pt to exhibit (L) elbow active flexion/° AROM to assist with reaching to put on shirt without pain. -MET    LONG TERM GOALS: Time for Goal Achievement: 2 months  1.  Pt score < 15% perceived disability on Quick DASH.- NOT MET  2.  Pt. to exhibit (L)elbow AROM to WFL to allow for dressing and bathing without pain limiting function.- NOT MET  3.  Pt to exhibit 5/5 UE strength to allow for pushing /pulling and lifting to occur with pain < 3/10- NOT MET    Goals: Partially Met    Discharge Plan: Future need for rehabilitation activities        Date of Discharge 7/5/22        Gisselle White, PT, DPT  Physical Therapist

## 2022-07-06 ENCOUNTER — TELEPHONE (OUTPATIENT)
Dept: ORTHOPEDIC SURGERY | Facility: CLINIC | Age: 37
End: 2022-07-06

## 2022-07-08 ENCOUNTER — HOSPITAL ENCOUNTER (OUTPATIENT)
Dept: MRI IMAGING | Facility: HOSPITAL | Age: 37
Discharge: HOME | End: 2022-07-08
Admitting: ORTHOPAEDIC SURGERY

## 2022-07-08 DIAGNOSIS — S52.022D CLOSED FRACTURE OF OLECRANON PROCESS OF LEFT ULNA WITH ROUTINE HEALING, SUBSEQUENT ENCOUNTER: ICD-10-CM

## 2022-07-08 DIAGNOSIS — M25.522 LEFT ELBOW PAIN: ICD-10-CM

## 2022-07-08 PROCEDURE — 73221 MRI JOINT UPR EXTREM W/O DYE: CPT

## 2022-07-14 ENCOUNTER — TELEPHONE (OUTPATIENT)
Dept: ORTHOPEDIC SURGERY | Facility: CLINIC | Age: 37
End: 2022-07-14

## 2022-07-14 NOTE — TELEPHONE ENCOUNTER
PATIENT CALLED IN WANTING HIS MRI RESULTS. I INFORMED PATIENT HE WOULD HAVE TO SCHEDULE AN IN OFFICE VISIT IN ORDER TO GO OVER THE RESULTS WITH DR DE LA ROSA. INFORMED PATIENT THIS HAS NOTHING TO DO WITH IF HIS RESULTS ARE BAD OR GOOD, WE REQUIRE PATIENTS TO COME IN TO GO OVER THE RESULTS WITH A PROVIDER. APOLOGIZED THAT HE WAS NOT AWARE AS WE TRY TO TELL ALL PATIENTS AT CHECK OUT TO CALL US BACK ONCE THEY KNOW WHAT DAY THEY ARE HAVING THE MRI DONE SO THAT WE CAN ALREADY HAVE THE FOLLOW UP IN PLACE.  PATIENT ASKED IF HE COULD HER THE RESULTS FROM WHERE HE HAD IT PERFORMED AT-INFORMED PATIENT THAT HE MAY BE ABLE TO GET THE RESULTS FROM WHOEVER TOOK THEM BUT I WOULD NOT BE ABLE TO GIVE HIM A YES OR NO FOR SURE. OFFERED PATIENT AN APPOINTMENT FOR 07/21/2022 WITH DR DE LA ROSA BUT HE DECLINED.

## 2022-11-02 ENCOUNTER — TELEPHONE (OUTPATIENT)
Dept: ORTHOPEDIC SURGERY | Facility: CLINIC | Age: 37
End: 2022-11-02

## 2022-11-02 NOTE — TELEPHONE ENCOUNTER
Provider: DR. DE LA ROSA    Caller: PATIENT    Relationship to Patient: SELF      Phone Number: [phone]    Reason for Call: PT. SEES DR. DE LA ROSA FOR HIS LEFT ELBOW.   HE STATES THAT DR. DE LA ROSA DID SURGERY, THEN HE HAD A TRICEPS TEAR NOT LONG AFTER THAT.   PT. STATES THAT HE IS CONCERNED ABOUT THE MUSCLE MASS NOT RETUNING IN THIS ARM.   ASKING WHAT DR. DE LA ROSA ADVISES, OR DOES HE NEED TO MAKE APPT. ?  THERE ARE NO SOON OPENINGS.   PLEASE CALL TO ADVISE.

## 2022-11-15 ENCOUNTER — OFFICE VISIT (OUTPATIENT)
Dept: ORTHOPEDIC SURGERY | Facility: CLINIC | Age: 37
End: 2022-11-15

## 2022-11-15 VITALS — WEIGHT: 252 LBS | BODY MASS INDEX: 32.34 KG/M2 | HEART RATE: 60 BPM | HEIGHT: 74 IN

## 2022-11-15 DIAGNOSIS — M25.522 LEFT ELBOW PAIN: Primary | ICD-10-CM

## 2022-11-15 PROCEDURE — 99213 OFFICE O/P EST LOW 20 MIN: CPT | Performed by: PHYSICIAN ASSISTANT

## 2022-11-15 RX ORDER — GUANFACINE 3 MG/1
TABLET, EXTENDED RELEASE ORAL
COMMUNITY

## 2022-11-15 RX ORDER — BUPROPION HYDROCHLORIDE 200 MG/1
TABLET, EXTENDED RELEASE ORAL
COMMUNITY

## 2022-11-15 NOTE — PROGRESS NOTES
"   Patient ID: Abhijit Mckee is a 37 y.o. male 2/18/22 ORIF left olecranon with suture anchors. He reports in June tearing his tricep while wrestling with his brother. He subsequently had a MRI of the LUE that demonstrated \"mild partial thickness tearing along the musculotendinous junction of the medial triceps tendon fibers.\" He did not follow up on these results with Dr. Pimentel and instead decided to give this injury time to heal by avoiding strenuous lifting mostly left upper extremity.  Today, he reports inability to lift more than 7.5lbs of tricep extensions.  Reports this weight causes a sharp sensation in the left tricep. \"Feels like I am missing a muscle along the middle part of my arm\".  He notes there is a visual discrepancy between the right upper extremity and left upper extremity with regards to the tricep muscle definition.  He can do bicep curls but is substantially weaker with tricep exercises when compared to the right.     This patient was discussed with Dr. Pimentel on 11/16/2022 with regards to past care and treatment options.    Objective:  Pulse 60   Ht 188 cm (74\")   Wt 114 kg (252 lb)   BMI 32.35 kg/m²     Physical Examination:  Left upper extremity:  Intact skin.  Well-healed surgical scar over the posterior elbow.  No signs of any infection. lacks approximately 3 degrees of extension, full flexion though.  Decreased definition of the tricep distally when compared to the right side.  Decreased strength with resisted tricep/extension at the elbow when compared to the right side.    Full range of motion at the wrist and all digits.  5/5  strength.   Radial pulse 2+    Imaging:   MRI Elbow Left Without Contrast (07/08/2022 10:56)  FINDINGS:     Ligaments:  The ulnar collateral ligament is intact. The radial collateral ligament is intact.  The lateral ulnar collateral ligament is intact.  Annular ligament is within normal limits.     Tendons:   There is heterogeneous signal intensity and " slight increased T2 signal intensity of the distal triceps muscle along the ulnar aspect consistent with musculotendinous partial thickness tearing. The tendon itself appears intact. There is nonspecific soft   tissue edema posterior to the triceps tendon with some postsurgical low signal intensity related to prior procedure.  The biceps insertion on the radial tuberosity is normal.  The brachialis tendon is normal. The common flexor tendon origin is intact.      The common extensor tendon origin is intact.     Bone:  There has been open reduction internal fixation of the fracture of the olecranon. There is minimal remaining edema at the fracture site. The alignment of the fracture site is anatomic. There are suture anchors in the olecranon.     Cartilage:  No focal cartilage defects or significant cartilage thinning.     Nerves:  No evidence of ulnar, median or radial nerve abnormality.     Other Intraarticular:  No significant joint effusion.  No loose bodies identified.     Extra-articular:  No evidence of olecranon bursitis.  No soft tissue mass.     IMPRESSION:  1.there are findings of a subacute/remote fracture olecranon with mild remaining edema in anatomic alignment status post open reduction internal fixation.  2.There is evidence of mild partial thickness tearing along the musculotendinous junction of the medial triceps tendon fibers. This is likely subacute to chronic. The triceps tendon is intact.  3.There is some postsurgical change with increased soft tissue signal intensity and metallic susceptibility artifact along the posterior distal triceps tendon.    Assessment:    Diagnoses and all orders for this visit:    1. Left elbow pain (Primary)  -     Cancel: XR Elbow 2 View Left  -     Ambulatory Referral to Physical Therapy Evaluate and treat       Plan: Recommend PT to evaluate and treat the triceps msuculotendinous junction tearing. Follow up in 4 weeks for reevaluation and consideration for  PRP.      Disclaimer: Part of this note may be an electronic transcription/translation of spoken language to printed text using the Dragon Dictation System

## 2022-11-18 ENCOUNTER — TREATMENT (OUTPATIENT)
Dept: PHYSICAL THERAPY | Facility: CLINIC | Age: 37
End: 2022-11-18

## 2022-11-18 DIAGNOSIS — M25.522 LEFT ELBOW PAIN: Primary | ICD-10-CM

## 2022-11-18 PROCEDURE — 97161 PT EVAL LOW COMPLEX 20 MIN: CPT | Performed by: PHYSICAL THERAPIST

## 2022-11-18 PROCEDURE — 97110 THERAPEUTIC EXERCISES: CPT | Performed by: PHYSICAL THERAPIST

## 2022-11-18 NOTE — PROGRESS NOTES
Physical Therapy Initial Evaluation and Plan of Care    Patient: Abhijit Mckee   : 1985  Diagnosis/ICD-10 Code:  Left elbow pain [M25.522]  Referring practitioner: Clifford Heard PA-C  Outcome Measure:Quick DASH:ADL: 40.9% disability, Work: 0% disability, Sports:100% disability    Diagnoses and all orders for this visit:    1. Left elbow pain (Primary)         Subjective Evaluation    History of Present Illness  Mechanism of injury: Pt reports to therapy with L elbow pain, and triceps pain that started several months ago while wrestling with his brother and felt a pop. Pt has a hx of a ORIF of L olecranon following a fx that resulted from a fall. Pt had went through a bout of therapy for the fx and per the pt was resolved. Pt states that he backed off of working out following the pop and as he gradually tried to re-introduce exercise he was unable without pain or without full activation of his medial triceps. Pt indicated that he spoke about getting a PRP shot if PT is unsuccessful      Aggravating factors: cold, lifting, walking    Alleviating factors: sitting, rest      PMHx:anxiety, depression, headache, liver disease, marijuana use      Patient Occupation:  Quality of life: good    Pain  Current pain rating: 3  At best pain ratin  At worst pain ratin    Hand dominance: right    Patient Goals  Patient goals for therapy: increased motion, decreased pain, increased strength, independence with ADLs/IADLs, return to sport/leisure activities, improved balance and decreased edema             Objective          Observations     Left Elbow   Positive for atrophy.       Palpation   Left   Tenderness of the triceps.     Tenderness     Left Elbow   Tenderness in the distal triceps tendon and olecranon process.     Left Wrist/Hand   Tenderness in the distal triceps tendon and olecranon process.     Neurological Testing     Sensation     Elbow   Left Elbow   Intact: light  touch    Right Elbow   Intact: light touch    Active Range of Motion     Left Elbow   Flexion: 140 degrees   Extension: 5 degrees   Forearm supination: 80 degrees   Forearm pronation: 90 degrees     Right Elbow   Flexion: 138 degrees   Extension: 10 degrees   Forearm supination: 76 degrees   Forearm pronation: 80 degrees     Additional Active Range of Motion Details  L ext: from neutral  R HE    Joint Play     Left Elbow   Joints within functional limits are the proximal radioulnar joint. Hypomobile in the humeroulnar joint, humeroradial joint and distal radioulnar joint.    Strength/Myotome Testing     Left Shoulder     Planes of Motion   Flexion: 5   Extension: 5   Abduction: 5   External rotation at 0°: 5   Internal rotation at 0°: 5     Isolated Muscles   Biceps: 5   Triceps: 4+     Right Shoulder     Planes of Motion   Flexion: 5   Extension: 5   Abduction: 5   External rotation at 0°: 5   Internal rotation at 0°: 5     Isolated Muscles   Biceps: 5   Triceps: 5     Left Elbow   Flexion: 5  Extension: 4+  Forearm supination: 5  Forearm pronation: 5    Right Elbow   Flexion: 5  Extension: 5  Forearm supination: 5  Forearm pronation: 5          Assessment & Plan     Assessment  Impairments: abnormal muscle firing, abnormal muscle tone, abnormal or restricted ROM, activity intolerance, impaired physical strength, lacks appropriate home exercise program and pain with function  Functional Limitations: carrying objects, lifting, sleeping, pulling, pushing, uncomfortable because of pain, moving in bed, reaching behind back, reaching overhead and unable to perform repetitive tasks  Assessment details: Patient is a 37 y.o. year old male who presents to therapy with L elbow pain.  he presents with significant impairments including decreased LUE ROM, decreased LUE strength, atrophy of L medial triceps, poor muscle activation, impaired Quick DASH score, and pain. Impairments affect ADL/IADL's, functional activities,  recreational activities, and community activities. Pt will benefit from skilled physical therapy to address impairments, decrease pain and restore function.  Prognosis: good    Goals  Plan Goals: SHORT TERM GOALS: Time for Goal Achievement: 4 weeks  1.  Patient to be compliant with and understand progression of HEP.   2.  Increase  (L)radio ulnar, (L) humeral unlar  mobility to allow for improved mobility of elbow with less pain  3.  Increased (L) UE strength to 4+/5 to allow for household activities, including lifting.  4.  Pt to exhibit (L) elbow active flexion/° AROM to assist with reaching to put on shirt without pain.    LONG TERM GOALS: Time for Goal Achievement: 2 months  1.  Pt score < 15% perceived disability on Quick DASH.  2.  Pt. to exhibit (L)elbow AROM to WFL to allow for dressing and bathing without pain limiting function.  3.  Pt to exhibit 5/5 UE strength to allow for pushing /pulling and lifting to occur with pain < 3/10  4. Pt will be able to return to recreational activities with minimal to no pain or issues.     Plan  Therapy options: will be seen for skilled therapy services  Planned modality interventions: cryotherapy, electrical stimulation/Russian stimulation, high voltage pulsed current (pain management), TENS, thermotherapy (hydrocollator packs) and ultrasound  Planned therapy interventions: manual therapy, motor coordination training, neuromuscular re-education, postural training, soft tissue mobilization, spinal/joint mobilization, strengthening, stretching, therapeutic activities, joint mobilization, IADL retraining, home exercise program, functional ROM exercises, flexibility, fine motor coordination training, abdominal trunk stabilization, ADL retraining, body mechanics training and balance/weight-bearing training  Frequency: 1x week  Duration in weeks: 8  Treatment plan discussed with: patient        History # of Personal Factors and/or Comorbidities: HIGH (3+)  Examination of  Body System(s): # of elements: LOW (1-2)  Clinical Presentation: UNSTABLE   Clinical Decision Making: LOW     Timed:         Manual Therapy:    5     mins  66542;     Therapeutic Exercise:    10     mins  27823;     Neuromuscular Son:        mins  52565;    Therapeutic Activity:          mins  58603;     Gait Training:           mins  05419;     Ultrasound:          mins  11399;    Ionto                                   mins   81154  Self Care                            mins   49919        Un-Timed:  Electrical Stimulation:         mins  86872 ( );  Dry Needling          mins self-pay  Traction          mins 11011  Low Eval     25     Mins  08405  Mod Eval          Mins  71614  High Eval                            Mins  79780  Re-Eval                               mins  74335        Timed Treatment:   15   mins   Total Treatment:     40   mins  PT SIGNATURE: Gisselle White PT, DPT          DATE TREATMENT INITIATED: 11/18/2022    Initial Certification  Certification Period: 2/16/2023  I certify that the therapy services are furnished while this patient is under my care.  The services outlined above are required by this patient, and will be reviewed every 90 days.     PHYSICIAN: Clifford Heard PA-C      DATE:     Please sign and return via fax to [phone].. Thank you, [facility].

## 2022-12-02 ENCOUNTER — TELEPHONE (OUTPATIENT)
Dept: ORTHOPEDIC SURGERY | Facility: CLINIC | Age: 37
End: 2022-12-02

## 2022-12-02 NOTE — TELEPHONE ENCOUNTER
CALLED PATIENT, VM IS FULL UNABLE TO LEAVE MESSAGE. IF PATIENT RETURNS CALL, PLEASE TRANSFER TO OFFICE.

## 2022-12-02 NOTE — TELEPHONE ENCOUNTER
Caller: Abhijit Mckee    Relationship to patient: Self    Best call back number:     Chief complaint: LEFT ARM PAIN     Type of visit: FUP INJECTION     HIS PHYSICAL THERAPIST SAID TO TRY AND GET INJECTION FIRST

## 2022-12-06 ENCOUNTER — OFFICE VISIT (OUTPATIENT)
Dept: ORTHOPEDIC SURGERY | Facility: CLINIC | Age: 37
End: 2022-12-06

## 2022-12-06 VITALS — BODY MASS INDEX: 32.34 KG/M2 | HEIGHT: 74 IN | HEART RATE: 76 BPM | OXYGEN SATURATION: 97 % | WEIGHT: 252 LBS

## 2022-12-06 DIAGNOSIS — S46.312A STRAIN OF LEFT TRICEPS TENDON, INITIAL ENCOUNTER: Primary | ICD-10-CM

## 2022-12-06 PROCEDURE — PRPACP: Performed by: FAMILY MEDICINE

## 2022-12-06 PROCEDURE — 99213 OFFICE O/P EST LOW 20 MIN: CPT | Performed by: FAMILY MEDICINE

## 2022-12-06 RX ORDER — TESTOSTERONE ENANTHATE 75 MG/.5ML
INJECTION SUBCUTANEOUS
COMMUNITY
Start: 2022-11-08

## 2022-12-06 RX ORDER — GUANFACINE 1 MG/1
1 TABLET ORAL
COMMUNITY
Start: 2022-10-03 | End: 2022-12-06 | Stop reason: SDUPTHER

## 2022-12-06 NOTE — PROGRESS NOTES
Primary Care Sports Medicine Office Visit Note     Patient ID: Abhijit Mckee is a 37 y.o. male.    Chief Complaint:  Chief Complaint   Patient presents with   • Left Elbow - Pain, Follow-up     HPI:     Mr. Abhijit Mckee is a 37 y.o. male. The patient presents to the clinic today for platelet-rich plasma injection of the left triceps. He is accompanied by his girlfriend.    The patient states that he broke his left elbow in 02/2022 and Dr. Pimentel performed surgery to repair this, with pins placed in his left elbow. He reports that there was a mix-up when he was fitted for an elbow brace and as a result, he does not yet have full range of motion. He reports that a short time after that surgery, he tore his triceps tendon and it has not gone back into place. He states that he was encouraged to attend physical therapy. However, he reports that when he spoke with the physical therapist, she informed him that it would not make sense that he would attend physical therapy prior to receiving injections. He states that most of the pain he has is when he is performing a triceps extension. He reports that he cannot pull down more than 7.5 pounds and he experiences pain after approximately 10 repetitions. He states that he has to reduce the weight to 5 pounds by the time he is performing his second or third set of repetitions. He reports that he has not had any imaging of his triceps since the magnetic resonance imaging done after his initial injury.    Past Medical History:   Diagnosis Date   • Depression with anxiety    • Headache    • Injury of back    • Kidney stones     stent in and removed- right   • Non-alcoholic fatty liver disease    • Suicidal ideation     nasal ketamine. meds for anxiety, in therapy       Past Surgical History:   Procedure Laterality Date   • CYSTOSCOPY, URETEROSCOPY, RETROGRADE PYELOGRAM, STENT INSERTION     • ELBOW OPEN REDUCTION INTERNAL FIXATION Left 2/18/2022    Procedure: olicrinon OPEN  "REDUCTION INTERNAL FIXATION;  Surgeon: Bradley Pimentel MD;  Location: [facility] MAIN OR;  Service: Orthopedics;  Laterality: Left;   • EYE SURGERY      lasik   • TOE SURGERY Left     5th toe   • TONSILLECTOMY         History reviewed. No pertinent family history.  Social History     Occupational History   • Not on file   Tobacco Use   • Smoking status: Never   • Smokeless tobacco: Never   Vaping Use   • Vaping Use: Never used   Substance and Sexual Activity   • Alcohol use: Not Currently   • Drug use: Yes     Types: Marijuana   • Sexual activity: Defer      Review of Systems   Constitutional: Negative for activity change, fatigue and fever.   Musculoskeletal: Positive for arthralgias.   Skin: Negative for color change and rash.   Neurological: Negative for numbness.     Objective:    Pulse 76   Ht 188 cm (74\")   Wt 114 kg (252 lb)   SpO2 97%   BMI 32.35 kg/m²     Physical Examination:  Physical Exam  Vitals and nursing note reviewed.   Constitutional:       General: He is not in acute distress.     Appearance: He is well-developed. He is not diaphoretic.   HENT:      Head: Normocephalic and atraumatic.   Eyes:      Conjunctiva/sclera: Conjunctivae normal.   Pulmonary:      Effort: Pulmonary effort is normal. No respiratory distress.   Skin:     General: Skin is warm.      Capillary Refill: Capillary refill takes less than 2 seconds.   Neurological:      Mental Status: He is alert.       Left Elbow Exam     Comments:  Left elbow examination yields near full range of motion with mild extensor lag to about 5 degrees. There is no tenderness to palpation to the olecranon bony prominence, posterior triceps insertion. There is tenderness though to the proximal triceps tendon midsubstance. Strength is 5/5 to extension.        Imaging and other tests:  No new imaging today.    MR dated 7/8/2022 of the left elbow was reviewed.  It reveals the following  IMPRESSION:  1.there are findings of a subacute/remote fracture " olecranon with mild remaining edema in anatomic alignment status post open reduction internal fixation.  2.There is evidence of mild partial thickness tearing along the musculotendinous junction of the medial triceps tendon fibers. This is likely subacute to chronic. The triceps tendon is intact.  3.There is some postsurgical change with increased soft tissue signal intensity and metallic susceptibility artifact along the posterior distal triceps tendon.    Assessment and Plan:    1. Triceps tendon tear    I discussed pathology and treatment options with the patient today. We reviewed his magnetic resonance imaging at length. Questions were answered and after discussing risks and benefits, the patient elected to proceed with platelet-rich plasma injection to the left triceps tendon. This was done under ultrasound guidance, and he tolerated the procedure well without complaint or problems. We discussed no anti-inflammatories for the next 4 weeks. Return to clinic in that timeframe for reevaluation with ultrasound and further treatment at that time.      Transcribed from ambient dictation for Marty Welch II,  by Sujata Torres.  12/06/22   16:16 EST    Patient or patient representative verbalized consent to the visit recording.  I have personally performed the services described in this document as transcribed by the above individual, and it is both accurate and complete.    Disclaimer: Please note that areas of this note were completed with computer voice recognition software.  Quite often unanticipated grammatical, syntax, homophones, and other interpretive errors are inadvertently transcribed by the computer software. Please excuse any errors that have escaped final proofreading.

## 2022-12-06 NOTE — PROGRESS NOTES
"Procedure   - Injection Tendon or Ligament    Date/Time: 12/6/2022 5:36 PM  Performed by: Marty Welch II, DO  Authorized by: Marty Welch II, DO   Local anesthesia used: no    Anesthesia:  Local anesthesia used: no    Sedation:  Patient sedated: no    Patient tolerance: patient tolerated the procedure well with no immediate complications  Comments: After discussing risk and benefits, the patient was identified, and the left posterior upper extremity was prepped and draped in usual fashion.  Alcohol and Betadine was used to cleanse the skin.  Ethyl chloride was used for skin anesthesia, and ultrasound was used to visualize the defect in the proximal triceps tendon.  Under direct visualization the 22-gauge needle was used to infiltrate the deep.  6 cc of PRP was then injected under visualization.  Images were saved in the ultrasound machine.  The needle was then removed.  Blood loss negligible.  A bandage was placed.  The patient tolerated procedure incredibly well and had no immediate complications or problems.  RTC in 1 month. Again, discuss no NSAIDs for the next 4-6 weeks.     Marty DE LA TORRE \"Chance\" Yuri LUQUE DO, CAQSM  12/06/22  17:36 EST      Ultrasound guidance: yes         "

## 2023-01-10 ENCOUNTER — OFFICE VISIT (OUTPATIENT)
Dept: ORTHOPEDIC SURGERY | Facility: CLINIC | Age: 38
End: 2023-01-10
Payer: COMMERCIAL

## 2023-01-10 ENCOUNTER — DOCUMENTATION (OUTPATIENT)
Dept: PHYSICAL THERAPY | Facility: CLINIC | Age: 38
End: 2023-01-10
Payer: COMMERCIAL

## 2023-01-10 VITALS — BODY MASS INDEX: 32.34 KG/M2 | HEIGHT: 74 IN | OXYGEN SATURATION: 97 % | HEART RATE: 70 BPM | WEIGHT: 252 LBS

## 2023-01-10 DIAGNOSIS — M25.522 LEFT ELBOW PAIN: Primary | ICD-10-CM

## 2023-01-10 PROCEDURE — 99214 OFFICE O/P EST MOD 30 MIN: CPT | Performed by: FAMILY MEDICINE

## 2023-01-10 RX ORDER — DEXTROMETHORPHAN HYDROBROMIDE, BUPROPION HYDROCHLORIDE 105; 45 MG/1; MG/1
TABLET, MULTILAYER, EXTENDED RELEASE ORAL
COMMUNITY
Start: 2022-12-21

## 2023-01-10 NOTE — PROGRESS NOTES
Primary Care Sports Medicine Office Visit Note     Patient ID: Abhijit Mckee is a 37 y.o. male.    Chief Complaint:  Chief Complaint   Patient presents with   • Left Elbow - Follow-up, Pain     PRP Follow up      HPI:     Mr. Abhijit Mckee is a 37 y.o. male. The patient presents to the clinic today for 4-week follow-up of platelet-rich plasma injection to the left triceps.    The patient reports that he is doing well. He states that he has been staying away from anti-inflammatories. He notes that when he tries to extend his left elbow, it does not seem like it is stopped by bone, but it is super tight. He states that he has been wearing a compression sleeve, which helps.    Past Medical History:   Diagnosis Date   • Depression with anxiety    • Headache    • Injury of back    • Kidney stones     stent in and removed- right   • Non-alcoholic fatty liver disease    • Suicidal ideation     nasal ketamine. meds for anxiety, in therapy       Past Surgical History:   Procedure Laterality Date   • CYSTOSCOPY, URETEROSCOPY, RETROGRADE PYELOGRAM, STENT INSERTION     • ELBOW OPEN REDUCTION INTERNAL FIXATION Left 02/18/2022    Procedure: olicrinon OPEN REDUCTION INTERNAL FIXATION;  Surgeon: Bradley Pimentel MD;  Location: [facility] OR;  Service: Orthopedics;  Laterality: Left;   • EYE SURGERY      lasik   • FOOT SURGERY     • TOE SURGERY Left     5th toe   • TONSILLECTOMY         History reviewed. No pertinent family history.  Social History     Occupational History   • Not on file   Tobacco Use   • Smoking status: Never   • Smokeless tobacco: Never   Vaping Use   • Vaping Use: Never used   Substance and Sexual Activity   • Alcohol use: Not Currently   • Drug use: Yes     Types: Marijuana   • Sexual activity: Defer      Review of Systems   Constitutional: Negative for activity change, fatigue and fever.   Musculoskeletal: Positive for arthralgias.   Skin: Negative for color change and rash.   Neurological:  Negative for numbness.     Objective:    Pulse 70   Ht 188 cm (74\")   Wt 114 kg (252 lb)   SpO2 97%   BMI 32.35 kg/m²     Physical Examination:  Physical Exam  Vitals and nursing note reviewed.   Constitutional:       General: He is not in acute distress.     Appearance: He is well-developed. He is not diaphoretic.   HENT:      Head: Normocephalic and atraumatic.   Eyes:      Conjunctiva/sclera: Conjunctivae normal.   Pulmonary:      Effort: Pulmonary effort is normal. No respiratory distress.   Skin:     General: Skin is warm.      Capillary Refill: Capillary refill takes less than 2 seconds.   Neurological:      Mental Status: He is alert.       Left Elbow Exam     Comments:  Left elbow examination reveals full range of motion with 4/5 strength of elbow extension compared to the contralateral side. There is mild prominence of the soft tissues in the area of the olecranon insertion of the triceps tendon.        Imaging and other tests:  Imaging diagnostic nonvascular musculoskeletal ultrasound:  INDICATION: 4 week follow-up from previous platelet-rich plasma injection to triceps myotendinous junction.  Comparative data: Previous physical therapy, ultrasonographic evaluation during platelet-rich plasma injection dated 12/05/2022.  Findings: 2D gray scale ultrasound was used to evaluate the triceps tendon and myotendinous junction. There is some mild peritendinous edema distally in the area of the insertion of the olecranon, proximal to this, the area of previous concern with myotendinous junction tear, was reevaluated. The size and width of previously seen partial tendon tear is reduced with good hyperechoic heterogenous filling consistent with healing from platelet-rich plasma injection.    Assessment and Plan:    1. Left elbow pain  - Ambulatory Referral to Physical Therapy    2. Triceps tendon tear status post platelet-rich plasma, 4-week follow-up.    I discussed with the patient today that ultrasound  reevaluation yields considerable healing. I would like for him to start physical therapy for stretching and strengthening now of the triceps musculature. He will return to the clinic in 1 month for repeat evaluation.    Transcribed from ambient dictation for Marty Welch II, DO by Shoaib Johnson.  01/10/23   11:52 EST    Patient or patient representative verbalized consent to the visit recording.  I have personally performed the services described in this document as transcribed by the above individual, and it is both accurate and complete.    Disclaimer: Please note that areas of this note were completed with computer voice recognition software.  Quite often unanticipated grammatical, syntax, homophones, and other interpretive errors are inadvertently transcribed by the computer software. Please excuse any errors that have escaped final proofreading.

## 2023-01-10 NOTE — PROGRESS NOTES
Discharge Summary  Discharge Summary from Physical Therapy Report      Date of Initial PT visit: 11/18/22  Number of Visits Seen: 1     Discharge Status of Patient: Pt did not return following his initial evaluation. DC due to noncompliance.   Goals: Not Met    Discharge Plan: Future need for rehabilitation activities        Date of Discharge 1/10/23        Gisselle White, PT, DPT  Physical Therapist

## 2023-01-12 ENCOUNTER — TREATMENT (OUTPATIENT)
Dept: PHYSICAL THERAPY | Facility: CLINIC | Age: 38
End: 2023-01-12
Payer: COMMERCIAL

## 2023-01-12 DIAGNOSIS — M25.522 LEFT ELBOW PAIN: Primary | ICD-10-CM

## 2023-01-12 DIAGNOSIS — S52.022A CLOSED FRACTURE OF OLECRANON PROCESS OF LEFT ULNA, INITIAL ENCOUNTER: ICD-10-CM

## 2023-01-12 PROCEDURE — 97014 ELECTRIC STIMULATION THERAPY: CPT | Performed by: PHYSICAL THERAPIST

## 2023-01-12 PROCEDURE — 97140 MANUAL THERAPY 1/> REGIONS: CPT | Performed by: PHYSICAL THERAPIST

## 2023-01-12 PROCEDURE — 97161 PT EVAL LOW COMPLEX 20 MIN: CPT | Performed by: PHYSICAL THERAPIST

## 2023-01-12 PROCEDURE — 97110 THERAPEUTIC EXERCISES: CPT | Performed by: PHYSICAL THERAPIST

## 2023-01-12 NOTE — PROGRESS NOTES
Physical Therapy Initial Evaluation and Plan of Care    Patient: Abhijit Mckee   : 1985  Diagnosis/ICD-10 Code:  Left elbow pain [M25.522]  Referring practitioner: Marty Welch I*  Outcome Measure: QUICK DASH: ADL: 29.55% disability, work: 0% disability, Sports: 68.75% disability    Diagnoses and all orders for this visit:    1. Left elbow pain (Primary)    2. Closed fracture of olecranon process of left ulna, initial encounter         Subjective Evaluation    History of Present Illness  Mechanism of injury: Pt reports to therapy with L elbow pain, and triceps pain that started several months ago while wrestling with his brother and felt a pop. Pt has a hx of a ORIF of L olecranon following a fx that resulted from a fall. Pt had went through a bout of therapy for the fx and per the pt was resolved. Pt states that he backed off of working out following the pop and as he gradually tried to re-introduce exercise he was unable without pain or without full activation of his medial triceps. Pt reports that he recieved a PRP shot in his triceps and has noted some improvements.    Aggravating factors: cold, lifting, walking,     Alleviating factors: sitting, rest      PMHx:anxiety, depression, headache, liver disease, marijuana use      Patient Occupation: QA Pain  Current pain ratin  At best pain ratin  At worst pain ratin  Progression: improved    Hand dominance: right    Treatments  Previous treatment: physical therapy and injection treatment  Patient Goals  Patient goals for therapy: decreased edema, decreased pain, increased motion, independence with ADLs/IADLs, return to sport/leisure activities, return to work and increased strength             Objective   Observations     Left Elbow   Positive for atrophy.       Palpation   Left   Tenderness of the triceps.     Tenderness     Left Elbow   Tenderness in the distal triceps tendon and olecranon process.     Left Wrist/Hand   Tenderness  in the distal triceps tendon and olecranon process.     Neurological Testing     Sensation     Elbow   Left Elbow   Intact: light touch    Right Elbow   Intact: light touch    Active Range of Motion     Left Elbow   Flexion: 140 degrees   Extension: 5 degrees   Forearm supination:70 degrees   Forearm pronation: 80 degrees     Right Elbow   Flexion: 138 degrees   Extension: 10 degrees   Forearm supination: 76 degrees   Forearm pronation: 80 degrees     Additional Active Range of Motion Details  L ext: from neutral  R HE    Joint Play     Left Elbow   Joints within functional limits are the proximal radioulnar joint. Hypomobile in the humeroulnar joint, humeroradial joint and distal radioulnar joint.    Strength/Myotome Testing     Left Shoulder     Planes of Motion   Flexion: 5   Extension: 5   Abduction: 5   External rotation at 0°: 5   Internal rotation at 0°: 5     Isolated Muscles   Biceps: 5   Triceps:5     Right Shoulder     Planes of Motion   Flexion: 5   Extension: 5   Abduction: 5   External rotation at 0°: 5   Internal rotation at 0°: 5     Isolated Muscles   Biceps: 5   Triceps: 5     Left Elbow   Flexion: 5  Extension: 5  Forearm supination: 5  Forearm pronation: 5    Right Elbow   Flexion: 5  Extension: 5  Forearm supination: 5  Forearm pronation: 5          Assessment & Plan     Assessment  Impairments: abnormal coordination, abnormal muscle firing, abnormal muscle tone, abnormal or restricted ROM, activity intolerance, impaired physical strength, lacks appropriate home exercise program and pain with function  Functional Limitations: carrying objects, lifting, walking, pulling, pushing, uncomfortable because of pain, reaching behind back, reaching overhead and unable to perform repetitive tasks  Assessment details:  Patient is a 37 y.o. year old male who presents to therapy with L elbow pain.  he presents with significant impairments including decreased LUE ROM, decreased LUE strength, atrophy of L  medial triceps, poor muscle activation, impaired Quick DASH score, and pain. Impairments affect ADL/IADL's, functional activities, recreational activities, and community activities. Pt will benefit from skilled physical therapy to address impairments, decrease pain and restore function.    Prognosis: good    Goals  Plan Goals: Plan Goals: SHORT TERM GOALS: Time for Goal Achievement: 4 weeks  1.  Patient to be compliant with and understand progression of HEP.   2.  Increase  (L)radio ulnar, (L) humeral unlar  mobility to allow for improved mobility of elbow with less pain  3.  Increased (L) UE strength to 4+/5 to allow for household activities, including lifting.  4.  Pt to exhibit (L) elbow active flexion/° AROM to assist with reaching to put on shirt without pain.    LONG TERM GOALS: Time for Goal Achievement: 2 months  1.  Pt score < 15% perceived disability on Quick DASH.  2.  Pt. to exhibit (L)elbow AROM to WFL to allow for dressing and bathing without pain limiting function.  3.  Pt to exhibit 5/5 UE strength to allow for pushing /pulling and lifting to occur with pain < 3/10  4. Pt will be able to return to recreational activities with minimal to no pain or issues.       Plan  Therapy options: will be seen for skilled therapy services  Planned modality interventions: cryotherapy, electrical stimulation/Russian stimulation, high voltage pulsed current (pain management), TENS, thermotherapy (hydrocollator packs), ultrasound and microcurrent electrical stimulation  Planned therapy interventions: abdominal trunk stabilization, ADL retraining, balance/weight-bearing training, body mechanics training, fine motor coordination training, functional ROM exercises, flexibility, gait training, home exercise program, IADL retraining, joint mobilization, transfer training, therapeutic activities, stretching, strengthening, spinal/joint mobilization, soft tissue mobilization, postural training, neuromuscular  re-education, motor coordination training and manual therapy  Frequency: 1x week  Duration in weeks: 8  Treatment plan discussed with: patient        History # of Personal Factors and/or Comorbidities: MODERATE (1-2)  Examination of Body System(s): # of elements: LOW (1-2)  Clinical Presentation: STABLE   Clinical Decision Making: LOW     Timed:         Manual Therapy:    10     mins  07805;     Therapeutic Exercise:    10     mins  05325;     Neuromuscular Son:        mins  07317;    Therapeutic Activity:          mins  51333;     Gait Training:           mins  36197;     Ultrasound:          mins  24451;    Ionto                                   mins   09509  Self Care                            mins   77285        Un-Timed:  Electrical Stimulation:    10     mins  18608 ( );  Dry Needling          mins self-pay  Traction          mins 51582  Low Eval     10     Mins  70039  Mod Eval          Mins  87183  High Eval                            Mins  74295  Re-Eval                               mins  16482        Timed Treatment:   20   mins   Total Treatment:     40   mins  PT SIGNATURE: Gisselle White PT, DPT          DATE TREATMENT INITIATED: 1/12/2023    Initial Certification  Certification Period: 4/12/2023  I certify that the therapy services are furnished while this patient is under my care.  The services outlined above are required by this patient, and will be reviewed every 90 days.     PHYSICIAN: Marty Welch II, DO      DATE:     Please sign and return via fax to [phone].. Thank you, [facility].

## 2023-01-19 ENCOUNTER — TREATMENT (OUTPATIENT)
Dept: PHYSICAL THERAPY | Facility: CLINIC | Age: 38
End: 2023-01-19
Payer: COMMERCIAL

## 2023-01-19 DIAGNOSIS — S52.022A CLOSED FRACTURE OF OLECRANON PROCESS OF LEFT ULNA, INITIAL ENCOUNTER: ICD-10-CM

## 2023-01-19 DIAGNOSIS — M25.522 LEFT ELBOW PAIN: Primary | ICD-10-CM

## 2023-01-19 PROCEDURE — 97110 THERAPEUTIC EXERCISES: CPT | Performed by: PHYSICAL THERAPIST

## 2023-01-19 PROCEDURE — 97140 MANUAL THERAPY 1/> REGIONS: CPT | Performed by: PHYSICAL THERAPIST

## 2023-01-19 PROCEDURE — 97112 NEUROMUSCULAR REEDUCATION: CPT | Performed by: PHYSICAL THERAPIST

## 2023-01-23 NOTE — PROGRESS NOTES
Physical Therapy Daily Treatment Note    [address] [place] [address]    VISIT#: 2    Subjective   Abhijit Mckee reports that he had elbow soreness following his last session, but nothing greater than his normal elbow pain  Pain Rating (0/10): 4    Objective     See Exercise, Manual, and Modality Logs for complete treatment.     Patient Education: Progress of therapy and POC    Assessment/Plan  Pt started on the UBE today followed by NMES for elbow ext. IASTM to L bicep was also performed with noted improvements in elbow ext ROM following. Pt fatigued quickly while performing all triceps activation exercises with slight increase in pain following performing bicep stretching.     Plan  Progress per Plan of Care and Progress strengthening /stabilization /functional activity            Timed:         Manual Therapy:    10     mins  84397;     Therapeutic Exercise:    15     mins  44256;     Neuromuscular Son:    15    mins  55131;    Therapeutic Activity:          mins  88494;     Gait Training:           mins  38108;     Ultrasound:          mins  97130;    Ionto                                   mins   72616  Self Care                            mins   67818    Un-Timed:  Electrical Stimulation:         mins  72409 ( );  Dry Needling          mins self-pay  Traction          mins 31850  Low Eval          Mins  38567  Mod Eval          Mins  01632  High Eval                            Mins  87756  Re-Eval                               mins  79336    Timed Treatment:   40   mins   Total Treatment:     40   mins    Gisselle White PT, DPT  Physical Therapist

## 2023-01-25 ENCOUNTER — TREATMENT (OUTPATIENT)
Dept: PHYSICAL THERAPY | Facility: CLINIC | Age: 38
End: 2023-01-25
Payer: COMMERCIAL

## 2023-01-25 DIAGNOSIS — M25.522 LEFT ELBOW PAIN: Primary | ICD-10-CM

## 2023-01-25 DIAGNOSIS — S52.022A CLOSED FRACTURE OF OLECRANON PROCESS OF LEFT ULNA, INITIAL ENCOUNTER: ICD-10-CM

## 2023-01-25 PROCEDURE — 97140 MANUAL THERAPY 1/> REGIONS: CPT | Performed by: PHYSICAL THERAPIST

## 2023-01-25 PROCEDURE — 97110 THERAPEUTIC EXERCISES: CPT | Performed by: PHYSICAL THERAPIST

## 2023-01-25 PROCEDURE — 97112 NEUROMUSCULAR REEDUCATION: CPT | Performed by: PHYSICAL THERAPIST

## 2023-01-25 NOTE — PROGRESS NOTES
Physical Therapy Daily Treatment Note    [address] [place] [address]    VISIT#: 3    Subjective   Abhijit Mckee reports that he is doing well today with no new complaints  Pain Rating (0/10): 1    Objective     See Exercise, Manual, and Modality Logs for complete treatment.     Patient Education: Progress of therapy and POC    Assessment/Plan  Pt started on the UBE followed by IASTM to his L bicep prior to NMES to L triceps to ensure biceps muscle extensibility was not limited ROM of triceps ext. Pt with overall noted improvements in ROM and ability to maintain elbow extension at end ranges.     Plan  Progress per Plan of Care and Progress strengthening /stabilization /functional activity            Timed:         Manual Therapy:    10     mins  64477;     Therapeutic Exercise:    10     mins  62085;     Neuromuscular Son:    15    mins  19714;    Therapeutic Activity:          mins  15038;     Gait Training:           mins  46092;     Ultrasound:          mins  72935;    Ionto                                   mins   15306  Self Care                            mins   17262    Un-Timed:  Electrical Stimulation:         mins  96421 ( );  Dry Needling          mins self-pay  Traction          mins 37890  Low Eval          Mins  43782  Mod Eval          Mins  89848  High Eval                            Mins  14758  Re-Eval                               mins  04178    Timed Treatment:   35   mins   Total Treatment:     35   mins    Gisselle White PT, DPT  Physical Therapist

## 2023-02-07 ENCOUNTER — OFFICE VISIT (OUTPATIENT)
Dept: ORTHOPEDIC SURGERY | Facility: CLINIC | Age: 38
End: 2023-02-07
Payer: COMMERCIAL

## 2023-02-07 VITALS — OXYGEN SATURATION: 98 % | BODY MASS INDEX: 32.34 KG/M2 | HEIGHT: 74 IN | WEIGHT: 252 LBS | HEART RATE: 62 BPM

## 2023-02-07 DIAGNOSIS — S46.312D STRAIN OF LEFT TRICEPS TENDON, SUBSEQUENT ENCOUNTER: Primary | ICD-10-CM

## 2023-02-07 PROCEDURE — 99213 OFFICE O/P EST LOW 20 MIN: CPT | Performed by: FAMILY MEDICINE

## 2023-02-07 RX ORDER — GUANFACINE 3 MG/1
1 TABLET, EXTENDED RELEASE ORAL EVERY MORNING
COMMUNITY
Start: 2023-01-06

## 2023-02-07 NOTE — PROGRESS NOTES
"Primary Care Sports Medicine Office Visit Note     Patient ID: Abhijit Mckee is a 37 y.o. male.    Chief Complaint:  Chief Complaint   Patient presents with   • Left Elbow - Follow-up, Pain     Pain 0      HPI:    Mr. Abhijit Mckee is a 37 y.o. male. The patient presents to the clinic today for 4-week follow-up of left elbow pain.    The patient states he feels his muscles are coming back. He is working hard with physical therapy. He is doing electric shocks. His range of motion is good.      Past Medical History:   Diagnosis Date   • Depression with anxiety    • Headache    • Injury of back    • Kidney stones     stent in and removed- right   • Non-alcoholic fatty liver disease    • Suicidal ideation     nasal ketamine. meds for anxiety, in therapy       Past Surgical History:   Procedure Laterality Date   • CYSTOSCOPY, URETEROSCOPY, RETROGRADE PYELOGRAM, STENT INSERTION     • ELBOW OPEN REDUCTION INTERNAL FIXATION Left 02/18/2022    Procedure: olicrinon OPEN REDUCTION INTERNAL FIXATION;  Surgeon: Bradley Pimentel MD;  Location: [facility];  Service: Orthopedics;  Laterality: Left;   • EYE SURGERY      lasik   • FOOT SURGERY     • TOE SURGERY Left     5th toe   • TONSILLECTOMY         No family history on file.  Social History     Occupational History   • Not on file   Tobacco Use   • Smoking status: Never   • Smokeless tobacco: Never   Vaping Use   • Vaping Use: Never used   Substance and Sexual Activity   • Alcohol use: Not Currently   • Drug use: Yes     Types: Marijuana   • Sexual activity: Defer      Review of Systems   Constitutional: Negative for activity change, fatigue and fever.   Musculoskeletal: Positive for arthralgias.   Skin: Negative for color change and rash.   Neurological: Negative for numbness.     Objective:    Pulse 62   Ht 188 cm (74\")   Wt 114 kg (252 lb)   SpO2 98%   BMI 32.35 kg/m²     Physical Examination:  Physical Exam  Vitals and nursing note reviewed. "   Constitutional:       General: He is not in acute distress.     Appearance: He is well-developed. He is not diaphoretic.   HENT:      Head: Normocephalic and atraumatic.   Eyes:      Conjunctiva/sclera: Conjunctivae normal.   Pulmonary:      Effort: Pulmonary effort is normal. No respiratory distress.   Skin:     General: Skin is warm.      Capillary Refill: Capillary refill takes less than 2 seconds.   Neurological:      Mental Status: He is alert.       Left Elbow Exam     Comments:  Left elbow examination reveals full range of motion and 5/5 strength to extension, relatively symmetric compared to the contralateral.        Imaging and other tests:      Assessment and Plan:    1. Left triceps partial tear status post PRP    I discussed with the patient today that with physical therapy, he seems to have near completely resolved. His strength is excellent and he has no further pain. Okay to return to taking anti-inflammatories now 8 weeks status post PRP. Otherwise, okay to return to any and all weight lifting, athletic activity, etc. RTC to me as needed.    Transcribed from ambient dictation for Marty Welch II,  by Shoaib Johnson.  02/07/23   12:21 EST    Patient or patient representative verbalized consent to the visit recording.  I have personally performed the services described in this document as transcribed by the above individual, and it is both accurate and complete.    Disclaimer: Please note that areas of this note were completed with computer voice recognition software.  Quite often unanticipated grammatical, syntax, homophones, and other interpretive errors are inadvertently transcribed by the computer software. Please excuse any errors that have escaped final proofreading.

## 2023-02-16 ENCOUNTER — OFFICE (OUTPATIENT)
Dept: URBAN - METROPOLITAN AREA CLINIC 64 | Facility: CLINIC | Age: 38
End: 2023-02-16

## 2023-02-16 VITALS
HEART RATE: 67 BPM | HEIGHT: 74 IN | DIASTOLIC BLOOD PRESSURE: 103 MMHG | SYSTOLIC BLOOD PRESSURE: 141 MMHG | WEIGHT: 234 LBS

## 2023-02-16 DIAGNOSIS — R19.4 CHANGE IN BOWEL HABIT: ICD-10-CM

## 2023-02-16 DIAGNOSIS — R11.2 NAUSEA WITH VOMITING, UNSPECIFIED: ICD-10-CM

## 2023-02-16 DIAGNOSIS — R10.30 LOWER ABDOMINAL PAIN, UNSPECIFIED: ICD-10-CM

## 2023-02-16 PROCEDURE — 99213 OFFICE O/P EST LOW 20 MIN: CPT | Performed by: NURSE PRACTITIONER

## 2023-02-16 RX ORDER — PROMETHAZINE HYDROCHLORIDE 25 MG/1
TABLET ORAL
Qty: 30 | Refills: 6 | Status: COMPLETED
Start: 2023-02-16 | End: 2023-04-13

## 2023-02-16 RX ORDER — DICYCLOMINE HYDROCHLORIDE 20 MG/1
80 TABLET ORAL
Qty: 120 | Refills: 12 | Status: COMPLETED
Start: 2023-02-16 | End: 2023-04-13

## 2023-04-13 ENCOUNTER — OFFICE (OUTPATIENT)
Dept: URBAN - METROPOLITAN AREA CLINIC 64 | Facility: CLINIC | Age: 38
End: 2023-04-13

## 2023-04-13 VITALS
HEART RATE: 75 BPM | DIASTOLIC BLOOD PRESSURE: 78 MMHG | WEIGHT: 237 LBS | SYSTOLIC BLOOD PRESSURE: 118 MMHG | HEIGHT: 74 IN

## 2023-04-13 DIAGNOSIS — K21.9 GASTRO-ESOPHAGEAL REFLUX DISEASE WITHOUT ESOPHAGITIS: ICD-10-CM

## 2023-04-13 DIAGNOSIS — K76.0 FATTY (CHANGE OF) LIVER, NOT ELSEWHERE CLASSIFIED: ICD-10-CM

## 2023-04-13 DIAGNOSIS — R11.0 NAUSEA: ICD-10-CM

## 2023-04-13 DIAGNOSIS — K59.04 CHRONIC IDIOPATHIC CONSTIPATION: ICD-10-CM

## 2023-04-13 DIAGNOSIS — R10.30 LOWER ABDOMINAL PAIN, UNSPECIFIED: ICD-10-CM

## 2023-04-13 PROCEDURE — 99214 OFFICE O/P EST MOD 30 MIN: CPT | Performed by: NURSE PRACTITIONER

## 2023-04-13 RX ORDER — SORBITOL SOLUTION 70 %
SOLUTION, ORAL MISCELLANEOUS
Qty: 100 | Refills: 0 | Status: COMPLETED
Start: 2023-04-13 | End: 2024-02-02

## 2023-07-10 ENCOUNTER — TELEPHONE (OUTPATIENT)
Dept: ORTHOPEDIC SURGERY | Facility: CLINIC | Age: 38
End: 2023-07-10

## 2023-07-10 NOTE — TELEPHONE ENCOUNTER
Spoke with patient and advised we could work him in Wednesday 7/19/2023 @ 845am. He stated that would work and I moved his appointment. He voiced understanding and confirmed appointment.

## 2023-07-10 NOTE — TELEPHONE ENCOUNTER
Caller: Abhijit Mckee    Relationship to patient: Self    Best call back number: [phone]    Patient is needing:  PATIENT EXPERIENCING A LOT OF PAIN LT ELBOW AND HE CALLED TO SCHEDULE FIRST AVAILABLE WITH DR. GUTIERRES. PT CURRENTLY SCHD 07/21 AND WOULD LIKE TO KNOW IF POSSIBLE TO SEE HIM SOONER DUE TO THE AMOUNT OF PAIN HE IS EXPERIENCING. PLEASE ADVISE

## 2023-07-19 ENCOUNTER — OFFICE VISIT (OUTPATIENT)
Dept: ORTHOPEDIC SURGERY | Facility: CLINIC | Age: 38
End: 2023-07-19
Payer: COMMERCIAL

## 2023-07-19 VITALS — BODY MASS INDEX: 32.34 KG/M2 | WEIGHT: 252 LBS | HEART RATE: 64 BPM | HEIGHT: 74 IN | OXYGEN SATURATION: 99 %

## 2023-07-19 DIAGNOSIS — R29.898 LUE WEAKNESS: Primary | ICD-10-CM

## 2023-07-19 PROCEDURE — 99214 OFFICE O/P EST MOD 30 MIN: CPT | Performed by: FAMILY MEDICINE

## 2023-07-19 RX ORDER — SORBITOL SOLUTION 70 %
SOLUTION, ORAL MISCELLANEOUS
COMMUNITY
Start: 2023-04-13

## 2023-07-19 RX ORDER — LINACLOTIDE 145 UG/1
CAPSULE, GELATIN COATED ORAL
COMMUNITY
Start: 2023-07-02

## 2023-07-19 RX ORDER — DIVALPROEX SODIUM 500 MG/1
500 TABLET, EXTENDED RELEASE ORAL
COMMUNITY
Start: 2023-06-20

## 2023-07-19 NOTE — PROGRESS NOTES
"Primary Care Sports Medicine Office Visit Note     Patient ID: Abhijit Mckee is a 38 y.o. male.    Chief Complaint:  Chief Complaint   Patient presents with    Left Elbow - Pain, Follow-up     HPI:    Mr. Abhijit Mckee is a 38 y.o. male. The patient presents to the clinic today for follow-up evaluation of his left elbow.    The patient reports that his elbow is not doing well. It has been about 1 month now, and it keeps \"fire\" up and down his arm. It \"keeps like nerve pain\". It will be in his forearm, on the right side. It will fire on the front and back of his shoulder, down his triceps, on the inside, on the left and right at the elbow. The forearm is \"firing\" all the time. When he started noticing the pain, it would always occur if he rests his elbow, but now it is constant. When he lays down at night, he keeps his elbow at his side, and it will just randomly shoot it. When he tries to keep his elbow in a certain position, he gets a lot of pain in his elbow, so he straightens it out again, and then it does it again. It does not feel like it is coming from up here and shooting down his whole arm. He has been working out, and it has been going well up to a certain point. His muscle gets super tight, and he can not get any kind of load. He had a tendon tear, and ever since then, his muscles do not work the same on his right side as they do his left side. His left side is quite weaker, and he can not get it past a certain point. He did physical therapy, and it still can not take any kind of load. Physical therapy went alright, and they got to a certain point. When it fires, he loses strength in his arm. It is mostly in his elbow. He was going on a vacation on 06/20/2023, and during that whole trip, he had to use ibuprofen and Tylenol. He denies ever having an EMG nerve conduction study.      Past Medical History:   Diagnosis Date    Bipolar 2 disorder     Depression with anxiety     Headache     Injury of back  " "   Kidney stones     stent in and removed- right    Non-alcoholic fatty liver disease     Suicidal ideation     nasal ketamine. meds for anxiety, in therapy       Past Surgical History:   Procedure Laterality Date    CYSTOSCOPY, URETEROSCOPY, RETROGRADE PYELOGRAM, STENT INSERTION      ELBOW OPEN REDUCTION INTERNAL FIXATION Left 02/18/2022    Procedure: olicrinon OPEN REDUCTION INTERNAL FIXATION;  Surgeon: Bradley Pimentel MD;  Location: [facility];  Service: Orthopedics;  Laterality: Left;    EYE SURGERY      lasik    FOOT SURGERY      TOE SURGERY Left     5th toe    TONSILLECTOMY         Family History   Problem Relation Age of Onset    COPD Mother     Cancer Father     COPD Maternal Grandmother      Social History     Occupational History    Not on file   Tobacco Use    Smoking status: Never    Smokeless tobacco: Never   Vaping Use    Vaping Use: Never used   Substance and Sexual Activity    Alcohol use: Not Currently    Drug use: Yes     Types: Marijuana    Sexual activity: Defer      Review of Systems   Constitutional:  Negative for activity change, fatigue and fever.   Musculoskeletal:  Positive for arthralgias.   Skin:  Negative for color change and rash.   Neurological:  Negative for numbness.   Objective:    Pulse 64   Ht 188 cm (74\")   Wt 114 kg (252 lb)   SpO2 99%   BMI 32.35 kg/m²     Physical Examination:  Physical Exam  Vitals and nursing note reviewed.   Constitutional:       General: He is not in acute distress.     Appearance: He is well-developed. He is not diaphoretic.   HENT:      Head: Normocephalic and atraumatic.   Eyes:      Conjunctiva/sclera: Conjunctivae normal.   Pulmonary:      Effort: Pulmonary effort is normal. No respiratory distress.   Skin:     General: Skin is warm.      Capillary Refill: Capillary refill takes less than 2 seconds.   Neurological:      Mental Status: He is alert.     Left Elbow Exam     Comments:  Left elbow examination reveals full range of motion to " flexion and extension, pronation, supination. There is 5/5 strength in all of these motions. Noted, this is mildly decreased compared to the contralateral side. There is no obvious tenderness to palpation of the triceps tendinitis insertion, more proximally. Tinel's sign to the cubital tunnel is positive.        Imaging and other tests:  No new imaging today.    Assessment and Plan:    1. LUE weakness    - I discussed pathology and treatment options with the patient today. We will get EMG/nerve conduction study for further evaluation of nerves impingement about the elbow, return to clinic in 2 to 3 weeks after study for further evaluation and treatment at that time.    Transcribed from ambient dictation for Marty Welch II, DO by Pauline Ramirez.  07/19/23   10:56 EDT    Patient or patient representative verbalized consent to the visit recording.  I have personally performed the services described in this document as transcribed by the above individual, and it is both accurate and complete.    Disclaimer: Please note that areas of this note were completed with computer voice recognition software.  Quite often unanticipated grammatical, syntax, homophones, and other interpretive errors are inadvertently transcribed by the computer software. Please excuse any errors that have escaped final proofreading.

## 2023-07-26 ENCOUNTER — TELEPHONE (OUTPATIENT)
Dept: ORTHOPEDIC SURGERY | Facility: CLINIC | Age: 38
End: 2023-07-26

## 2023-07-26 NOTE — TELEPHONE ENCOUNTER
Caller: Abhijit Mckee    Relationship to patient: Self    Best call back number: [phone],  [facility]    Chief complaint: LEFT ELBOW PAIN    Type of visit: EMG TEST SCHEDULING    Requested date: ASAP       Additional notes: PATIENT HASN'T REC'D A CALL RE: SCHEDULING. PLEASE CALL TO ADVISE.

## 2023-12-01 ENCOUNTER — HOSPITAL ENCOUNTER (OUTPATIENT)
Dept: NEUROLOGY | Facility: HOSPITAL | Age: 38
Discharge: HOME | End: 2023-12-01
Payer: COMMERCIAL

## 2023-12-01 DIAGNOSIS — R29.898 LUE WEAKNESS: ICD-10-CM

## 2023-12-01 PROCEDURE — 95908 NRV CNDJ TST 3-4 STUDIES: CPT

## 2023-12-01 PROCEDURE — 95886 MUSC TEST DONE W/N TEST COMP: CPT

## 2024-02-02 ENCOUNTER — OFFICE (OUTPATIENT)
Dept: URBAN - METROPOLITAN AREA CLINIC 64 | Facility: CLINIC | Age: 39
End: 2024-02-02

## 2024-02-02 VITALS
HEART RATE: 67 BPM | HEIGHT: 74 IN | DIASTOLIC BLOOD PRESSURE: 97 MMHG | SYSTOLIC BLOOD PRESSURE: 125 MMHG | WEIGHT: 260 LBS

## 2024-02-02 DIAGNOSIS — K21.9 GASTRO-ESOPHAGEAL REFLUX DISEASE WITHOUT ESOPHAGITIS: ICD-10-CM

## 2024-02-02 DIAGNOSIS — K62.89 OTHER SPECIFIED DISEASES OF ANUS AND RECTUM: ICD-10-CM

## 2024-02-02 DIAGNOSIS — K59.00 CONSTIPATION, UNSPECIFIED: ICD-10-CM

## 2024-02-02 DIAGNOSIS — K62.5 HEMORRHAGE OF ANUS AND RECTUM: ICD-10-CM

## 2024-02-02 PROCEDURE — 99214 OFFICE O/P EST MOD 30 MIN: CPT | Performed by: NURSE PRACTITIONER

## 2024-02-02 RX ORDER — PANTOPRAZOLE SODIUM 40 MG/1
40 TABLET, DELAYED RELEASE ORAL
Qty: 90 | Refills: 3 | Status: COMPLETED
Start: 2024-02-02 | End: 2024-02-28

## 2024-02-02 RX ORDER — LUBIPROSTONE 24 UG/1
48 CAPSULE, GELATIN COATED ORAL
Qty: 60 | Refills: 5 | Status: COMPLETED
Start: 2024-02-02 | End: 2024-02-28

## 2024-02-29 ENCOUNTER — OFFICE (OUTPATIENT)
Dept: URBAN - METROPOLITAN AREA PATHOLOGY 19 | Facility: PATHOLOGY | Age: 39
End: 2024-02-29

## 2024-02-29 ENCOUNTER — ON CAMPUS - OUTPATIENT (OUTPATIENT)
Dept: URBAN - METROPOLITAN AREA HOSPITAL 2 | Facility: HOSPITAL | Age: 39
End: 2024-02-29

## 2024-02-29 VITALS
OXYGEN SATURATION: 95 % | DIASTOLIC BLOOD PRESSURE: 98 MMHG | WEIGHT: 258 LBS | DIASTOLIC BLOOD PRESSURE: 59 MMHG | DIASTOLIC BLOOD PRESSURE: 66 MMHG | SYSTOLIC BLOOD PRESSURE: 102 MMHG | HEART RATE: 63 BPM | HEART RATE: 64 BPM | OXYGEN SATURATION: 98 % | RESPIRATION RATE: 22 BRPM | OXYGEN SATURATION: 94 % | RESPIRATION RATE: 12 BRPM | SYSTOLIC BLOOD PRESSURE: 119 MMHG | HEART RATE: 62 BPM | DIASTOLIC BLOOD PRESSURE: 69 MMHG | DIASTOLIC BLOOD PRESSURE: 74 MMHG | SYSTOLIC BLOOD PRESSURE: 96 MMHG | TEMPERATURE: 97.7 F | DIASTOLIC BLOOD PRESSURE: 61 MMHG | DIASTOLIC BLOOD PRESSURE: 81 MMHG | SYSTOLIC BLOOD PRESSURE: 124 MMHG | OXYGEN SATURATION: 100 % | HEIGHT: 74 IN | RESPIRATION RATE: 14 BRPM | DIASTOLIC BLOOD PRESSURE: 83 MMHG | SYSTOLIC BLOOD PRESSURE: 135 MMHG | SYSTOLIC BLOOD PRESSURE: 106 MMHG | RESPIRATION RATE: 17 BRPM | HEART RATE: 71 BPM | RESPIRATION RATE: 16 BRPM | SYSTOLIC BLOOD PRESSURE: 118 MMHG | HEART RATE: 65 BPM

## 2024-02-29 DIAGNOSIS — K31.89 OTHER DISEASES OF STOMACH AND DUODENUM: ICD-10-CM

## 2024-02-29 DIAGNOSIS — D12.8 BENIGN NEOPLASM OF RECTUM: ICD-10-CM

## 2024-02-29 DIAGNOSIS — D12.3 BENIGN NEOPLASM OF TRANSVERSE COLON: ICD-10-CM

## 2024-02-29 DIAGNOSIS — K21.9 GASTRO-ESOPHAGEAL REFLUX DISEASE WITHOUT ESOPHAGITIS: ICD-10-CM

## 2024-02-29 DIAGNOSIS — K64.1 SECOND DEGREE HEMORRHOIDS: ICD-10-CM

## 2024-02-29 PROBLEM — K62.1 RECTAL POLYP: Status: ACTIVE | Noted: 2024-02-29

## 2024-02-29 PROBLEM — K63.5 POLYP OF COLON: Status: ACTIVE | Noted: 2024-02-29

## 2024-02-29 LAB
GI HISTOLOGY: A. STOMACH ANTRUM: (no result)
GI HISTOLOGY: B. TRANSVERSE COLON: (no result)
GI HISTOLOGY: C. RECTUM: (no result)
GI HISTOLOGY: PDF REPORT: (no result)

## 2024-02-29 PROCEDURE — 43239 EGD BIOPSY SINGLE/MULTIPLE: CPT | Performed by: INTERNAL MEDICINE

## 2024-02-29 PROCEDURE — 88305 TISSUE EXAM BY PATHOLOGIST: CPT | Performed by: PATHOLOGY

## 2024-02-29 PROCEDURE — 45385 COLONOSCOPY W/LESION REMOVAL: CPT | Performed by: INTERNAL MEDICINE

## 2024-02-29 RX ORDER — OMEPRAZOLE 40 MG/1
40 CAPSULE, DELAYED RELEASE ORAL
Qty: 90 | Refills: 3 | Status: ACTIVE
Start: 2024-02-29

## 2024-05-19 ENCOUNTER — APPOINTMENT (OUTPATIENT)
Dept: GENERAL RADIOLOGY | Facility: HOSPITAL | Age: 39
End: 2024-05-19
Payer: COMMERCIAL

## 2024-05-19 ENCOUNTER — HOSPITAL ENCOUNTER (EMERGENCY)
Facility: HOSPITAL | Age: 39
Discharge: HOME | End: 2024-05-19
Attending: EMERGENCY MEDICINE
Payer: COMMERCIAL

## 2024-05-19 VITALS
DIASTOLIC BLOOD PRESSURE: 102 MMHG | HEIGHT: 74 IN | TEMPERATURE: 97.7 F | WEIGHT: 265 LBS | SYSTOLIC BLOOD PRESSURE: 142 MMHG | HEART RATE: 69 BPM | RESPIRATION RATE: 15 BRPM | BODY MASS INDEX: 34.01 KG/M2 | OXYGEN SATURATION: 95 %

## 2024-05-19 DIAGNOSIS — S93.491A SPRAIN OF ANTERIOR TALOFIBULAR LIGAMENT OF RIGHT ANKLE, INITIAL ENCOUNTER: Primary | ICD-10-CM

## 2024-05-19 PROCEDURE — 99283 EMERGENCY DEPT VISIT LOW MDM: CPT

## 2024-05-19 PROCEDURE — 73610 X-RAY EXAM OF ANKLE: CPT

## 2024-05-19 PROCEDURE — 73630 X-RAY EXAM OF FOOT: CPT

## 2024-05-19 RX ORDER — HYDROCODONE BITARTRATE AND ACETAMINOPHEN 5; 325 MG/1; MG/1
1 TABLET ORAL ONCE AS NEEDED
Status: COMPLETED | OUTPATIENT
Start: 2024-05-19 | End: 2024-05-19

## 2024-05-19 RX ORDER — HYDROCODONE BITARTRATE AND ACETAMINOPHEN 5; 325 MG/1; MG/1
1 TABLET ORAL EVERY 6 HOURS PRN
Qty: 12 TABLET | Refills: 0 | Status: SHIPPED | OUTPATIENT
Start: 2024-05-19

## 2024-05-19 RX ORDER — IBUPROFEN 400 MG/1
800 TABLET ORAL ONCE
Status: DISCONTINUED | OUTPATIENT
Start: 2024-05-19 | End: 2024-05-19

## 2024-05-19 RX ADMIN — HYDROCODONE BITARTRATE AND ACETAMINOPHEN 1 TABLET: 5; 325 TABLET ORAL at 11:55

## 2024-05-19 NOTE — ED PROVIDER NOTES
Subjective   History of Present Illness    Review of Systems    Past Medical History:   Diagnosis Date    Bipolar 2 disorder     Depression with anxiety     Headache     Injury of back     Kidney stones     stent in and removed- right    Non-alcoholic fatty liver disease     Suicidal ideation     nasal ketamine. meds for anxiety, in therapy       Allergies   Allergen Reactions    Percocet [Oxycodone-Acetaminophen] Hives       Past Surgical History:   Procedure Laterality Date    CYSTOSCOPY, URETEROSCOPY, RETROGRADE PYELOGRAM, STENT INSERTION      ELBOW OPEN REDUCTION INTERNAL FIXATION Left 02/18/2022    Procedure: olicrinon OPEN REDUCTION INTERNAL FIXATION;  Surgeon: Bradley Pimentel MD;  Location: [facility] MAIN OR;  Service: Orthopedics;  Laterality: Left;    EYE SURGERY      lasik    FOOT SURGERY      TOE SURGERY Left     5th toe    TONSILLECTOMY         Family History   Problem Relation Age of Onset    COPD Mother     Cancer Father     COPD Maternal Grandmother        Social History     Socioeconomic History    Marital status: Single   Tobacco Use    Smoking status: Never    Smokeless tobacco: Never   Vaping Use    Vaping status: Never Used   Substance and Sexual Activity    Alcohol use: Not Currently    Drug use: Yes     Types: Marijuana    Sexual activity: Defer           Objective   Physical Exam    Procedures           ED Course        XR Ankle 3+ View Right    Result Date: 5/19/2024  Impression: Soft tissue swelling over the lateral malleolus. No acute osseous abnormality. Electronically Signed: Jose Velasquez MD  5/19/2024 11:24 AM EDT  Workstation ID: [id number]    XR Foot 3+ View Right    Result Date: 5/19/2024  Impression: Soft tissue swelling over the lateral malleolus. No acute osseous abnormality. Electronically Signed: Jose Velasquez MD  5/19/2024 11:24 AM EDT  Workstation ID: [id number]   Medications   HYDROcodone-acetaminophen (NORCO) 5-325 MG per tablet 1 tablet (has no administration in time  "range)     /97 (BP Location: Right arm, Patient Position: Sitting)   Pulse 63   Temp 97.7 °F (36.5 °C) (Oral)   Resp 16   Ht 188 cm (74\")   Wt 120 kg (265 lb)   SpO2 94%   BMI 34.02 kg/m²                                          Medical Decision Making  Amount and/or Complexity of Data Reviewed  Radiology: ordered.    Risk  Prescription drug management.    Differential sprain versus fracture  My x-ray review and interpretation right ankle reveals evidence of well-corticated osseous abnormality over medial malleolus.  There is soft tissue swelling over lateral malleolus.  There is no evidence of acute fracture in foot or ankle.  Patient was given a Norco for pain.  Patient's findings were discussed with him.  He was placed in Ace wrap and Aircast.  His inspect report was reviewed he was given short course of hydrocodone for pain advised to rest ice elevate ankle.  He was given podiatry follow-up.  Final diagnoses:   Sprain of anterior talofibular ligament of right ankle, initial encounter       ED Disposition  ED Disposition       ED Disposition   Discharge    Condition   Stable    Comment   --               Melinda Connell, APRN  [address] [facility] [place] [id number] [phone]          Irving Garland, MATEO  [address] [address]  [phone]               Medication List        New Prescriptions      HYDROcodone-acetaminophen 5-325 MG per tablet  Commonly known as: Norco  Take 1 tablet by mouth Every 6 (Six) Hours As Needed for Moderate Pain.               Where to Get Your Medications        These medications were [facility] to [id number] [place] [facility] [address] [address] - [phone] [place] [phone]   [address] [address] [address]      Phone: [phone]   HYDROcodone-acetaminophen 5-325 MG per tablet            Levar Hung MD  05/19/24 1153    "                   Medical Decision Making  Amount and/or Complexity of Data Reviewed  Radiology: ordered.    Risk  Prescription drug management.    Differential sprain versus fracture  My x-ray review and interpretation right ankle reveals evidence of well-corticated osseous abnormality over medial malleolus.  There is soft tissue swelling over lateral malleolus.  There is no evidence of acute fracture in foot or ankle.  Patient was given a Norco for pain.  Patient's findings were discussed with him.  He was placed in Ace wrap and Aircast.  His inspect report was reviewed he was given short course of hydrocodone for pain advised to rest ice elevate ankle.  He was given podiatry follow-up.  Final diagnoses:   Sprain of anterior talofibular ligament of right ankle, initial encounter       ED Disposition  ED Disposition       ED Disposition   Discharge    Condition   Stable    Comment   --               Melinda Connell, APRN  [address] [facility] [place] [address] [phone]          Irving Garland DPM  [address] [facility]  [place] [address]  [phone]               Medication List        New Prescriptions      HYDROcodone-acetaminophen 5-325 MG per tablet  Commonly known as: Norco  Take 1 tablet by mouth Every 6 (Six) Hours As Needed for Moderate Pain.               Where to Get Your Medications        These medications were sent to [facility]/pharmacy #[id number] - [place], [place] - [address] [address] - [phone] [place] [phone]   [address] [address] [place] [address]      Phone: [phone]   HYDROcodone-acetaminophen 5-325 MG per tablet            Levar Hung MD  05/19/24 1153       Levar Hung MD  06/05/24 1700

## 2024-07-12 ENCOUNTER — OFFICE (OUTPATIENT)
Dept: URBAN - METROPOLITAN AREA CLINIC 64 | Facility: CLINIC | Age: 39
End: 2024-07-12

## 2024-07-12 VITALS
SYSTOLIC BLOOD PRESSURE: 125 MMHG | DIASTOLIC BLOOD PRESSURE: 81 MMHG | HEART RATE: 60 BPM | HEIGHT: 74 IN | WEIGHT: 270 LBS

## 2024-07-12 DIAGNOSIS — K59.04 CHRONIC IDIOPATHIC CONSTIPATION: ICD-10-CM

## 2024-07-12 DIAGNOSIS — K29.70 GASTRITIS, UNSPECIFIED, WITHOUT BLEEDING: ICD-10-CM

## 2024-07-12 PROCEDURE — 99214 OFFICE O/P EST MOD 30 MIN: CPT | Performed by: NURSE PRACTITIONER

## 2025-01-13 ENCOUNTER — OFFICE (OUTPATIENT)
Dept: URBAN - METROPOLITAN AREA CLINIC 64 | Facility: CLINIC | Age: 40
End: 2025-01-13

## (undated) DEVICE — PAD UNDERCAST WYTEX 4IN 4YD LF STRL

## (undated) DEVICE — SOL IRRIG NACL 1000ML

## (undated) DEVICE — 1010 S-DRAPE TOWEL DRAPE 10/BX: Brand: STERI-DRAPE™

## (undated) DEVICE — DECANTER: Brand: UNBRANDED

## (undated) DEVICE — BNDG ELAS MATRX V/CLS 3INX5YD LF

## (undated) DEVICE — SLV SCD CALF HEMOFORCE DVT THERP REPROC MD

## (undated) DEVICE — SPNG LAP PREWSH SFTPK 18X18IN STRL PK/5

## (undated) DEVICE — SOLUTION,WATER,IRRIGATION,1000ML,STERILE: Brand: MEDLINE

## (undated) DEVICE — GOWN,REINFORCE,POLY,SIRUS,BREATH SLV,XLG: Brand: MEDLINE

## (undated) DEVICE — STAPLER 35 WIDE: Brand: MEDLINE INDUSTRIES, INC.

## (undated) DEVICE — 3M™ IOBAN™ 2 ANTIMICROBIAL INCISE DRAPE 6650EZ: Brand: IOBAN™ 2

## (undated) DEVICE — GLV SURG SIGNATURE ESSENTIAL PF LTX SZ8.5

## (undated) DEVICE — ADHS SKIN PREMIERPRO EXOFIN TOPICAL HI/VISC .5ML

## (undated) DEVICE — STAPLER, SKIN, 35W, A: Brand: MEDLINE INDUSTRIES, INC.

## (undated) DEVICE — UNDERGLV SURG BIOGEL INDICAT PI SZ8 BLU

## (undated) DEVICE — DRSNG GZ CURAD XEROFORM NONADHR OVERWRAP 5X9IN

## (undated) DEVICE — TUBING, SUCTION, 1/4" X 12', STRAIGHT: Brand: MEDLINE

## (undated) DEVICE — PENCL EVAC ULTRAVAC SMOKE W/BLD

## (undated) DEVICE — C-ARM: Brand: UNBRANDED

## (undated) DEVICE — NDL SUT CATGUT 1/2CIR TPR SZ6 1824-6D PK/2

## (undated) DEVICE — KT SUTURETAK 3MM 1P/U

## (undated) DEVICE — PK EXTREM 50

## (undated) DEVICE — ABL ASP APOLLO RF XL 90D

## (undated) DEVICE — APPL CHLORAPREP HI/LITE TINTED 10.5ML ORNG

## (undated) DEVICE — HEWSON SUTURE RETRIEVER: Brand: HEWSON SUTURE RETRIEVER

## (undated) DEVICE — CUFF TOURNI 1BLADDER 1PRT 18IN STRL

## (undated) DEVICE — GLV SURG SENSICARE PI ORTHO SZ8 LF STRL

## (undated) DEVICE — 3M™ STERI-DRAPE™ INSTRUMENT POUCH 1018: Brand: STERI-DRAPE™

## (undated) DEVICE — KT SURG TURNOVER 050

## (undated) DEVICE — DRSNG TELFA PAD NONADH STR 1S 3X8IN

## (undated) DEVICE — BNDG ESMARK 4IN 12FT LF STRL BLU

## (undated) DEVICE — SUT MONOCRYL 4/0 PS2 27IN Y426H ETY426H

## (undated) DEVICE — SUT VIC COAT 1 CP-1 27IN

## (undated) DEVICE — GAUZE,SPONGE,FLUFF,6"X6.75",STRL,5/TRAY: Brand: MEDLINE

## (undated) DEVICE — SUT VIC 2/0 CT2 27IN J269H